# Patient Record
Sex: FEMALE | Race: WHITE | HISPANIC OR LATINO
[De-identification: names, ages, dates, MRNs, and addresses within clinical notes are randomized per-mention and may not be internally consistent; named-entity substitution may affect disease eponyms.]

---

## 2018-02-09 ENCOUNTER — APPOINTMENT (OUTPATIENT)
Dept: DERMATOLOGY | Facility: CLINIC | Age: 78
End: 2018-02-09
Payer: MEDICARE

## 2018-02-09 PROCEDURE — 99201 OFFICE OUTPATIENT NEW 10 MINUTES: CPT

## 2018-08-10 ENCOUNTER — APPOINTMENT (OUTPATIENT)
Dept: DERMATOLOGY | Facility: CLINIC | Age: 78
End: 2018-08-10

## 2019-04-10 ENCOUNTER — APPOINTMENT (OUTPATIENT)
Dept: DERMATOLOGY | Facility: CLINIC | Age: 79
End: 2019-04-10
Payer: MEDICARE

## 2019-04-10 PROCEDURE — 99213 OFFICE O/P EST LOW 20 MIN: CPT

## 2023-01-21 ENCOUNTER — OFFICE (OUTPATIENT)
Dept: URBAN - METROPOLITAN AREA CLINIC 94 | Facility: CLINIC | Age: 83
Setting detail: OPHTHALMOLOGY
End: 2023-01-21
Payer: COMMERCIAL

## 2023-01-21 DIAGNOSIS — E11.3313: ICD-10-CM

## 2023-01-21 DIAGNOSIS — H25.043: ICD-10-CM

## 2023-01-21 DIAGNOSIS — H35.3233: ICD-10-CM

## 2023-01-21 DIAGNOSIS — E11.3311: ICD-10-CM

## 2023-01-21 PROCEDURE — 92235 FLUORESCEIN ANGRPH MLTIFRAME: CPT | Performed by: SPECIALIST

## 2023-01-21 PROCEDURE — 92134 CPTRZ OPH DX IMG PST SGM RTA: CPT | Performed by: SPECIALIST

## 2023-01-21 PROCEDURE — 92014 COMPRE OPH EXAM EST PT 1/>: CPT | Performed by: SPECIALIST

## 2023-01-21 PROCEDURE — 67210 TREATMENT OF RETINAL LESION: CPT | Performed by: SPECIALIST

## 2023-01-21 ASSESSMENT — KERATOMETRY
OS_K2POWER_DIOPTERS: 43.50
METHOD_AUTO_MANUAL: AUTO
OS_AXISANGLE_DEGREES: 90
OS_K1POWER_DIOPTERS: 43.50
OD_K2POWER_DIOPTERS: 44.25
OD_K1POWER_DIOPTERS: 43.25
OD_AXISANGLE_DEGREES: 134

## 2023-01-21 ASSESSMENT — REFRACTION_AUTOREFRACTION
OS_SPHERE: +2.50
OD_AXIS: 070
OD_SPHERE: +2.50
OS_AXIS: 086
OS_CYLINDER: -1.00
OD_CYLINDER: -1.25

## 2023-01-21 ASSESSMENT — VISUAL ACUITY
OD_BCVA: 20/40-
OS_BCVA: 20/80

## 2023-01-21 ASSESSMENT — AXIALLENGTH_DERIVED
OD_AL: 22.7973
OS_AL: 22.8376

## 2023-01-21 ASSESSMENT — SPHEQUIV_DERIVED
OD_SPHEQUIV: 1.875
OS_SPHEQUIV: 2

## 2023-01-21 ASSESSMENT — CONFRONTATIONAL VISUAL FIELD TEST (CVF)
OD_FINDINGS: FULL
OS_FINDINGS: FULL

## 2023-02-17 ENCOUNTER — OFFICE (OUTPATIENT)
Dept: URBAN - METROPOLITAN AREA CLINIC 63 | Facility: CLINIC | Age: 83
Setting detail: OPHTHALMOLOGY
End: 2023-02-17
Payer: COMMERCIAL

## 2023-02-17 DIAGNOSIS — E11.3312: ICD-10-CM

## 2023-02-17 PROCEDURE — 67210 TREATMENT OF RETINAL LESION: CPT | Performed by: SPECIALIST

## 2023-02-17 ASSESSMENT — CONFRONTATIONAL VISUAL FIELD TEST (CVF)
OS_FINDINGS: FULL
OD_FINDINGS: FULL

## 2023-02-17 ASSESSMENT — VISUAL ACUITY
OS_BCVA: 20/80
OD_BCVA: 20/30-1

## 2023-02-17 ASSESSMENT — KERATOMETRY
OD_AXISANGLE_DEGREES: 134
OD_K2POWER_DIOPTERS: 44.25
OS_K1POWER_DIOPTERS: 43.50
OS_AXISANGLE_DEGREES: 90
METHOD_AUTO_MANUAL: AUTO
OD_K1POWER_DIOPTERS: 43.25
OS_K2POWER_DIOPTERS: 43.50

## 2023-02-17 ASSESSMENT — REFRACTION_AUTOREFRACTION
OS_CYLINDER: -1.00
OD_AXIS: 070
OD_SPHERE: +2.50
OS_AXIS: 086
OS_SPHERE: +2.50
OD_CYLINDER: -1.25

## 2023-02-17 ASSESSMENT — SPHEQUIV_DERIVED
OS_SPHEQUIV: 2
OD_SPHEQUIV: 1.875

## 2023-02-17 ASSESSMENT — TONOMETRY
OS_IOP_MMHG: 16
OD_IOP_MMHG: 16

## 2023-02-17 ASSESSMENT — AXIALLENGTH_DERIVED
OD_AL: 22.7973
OS_AL: 22.8376

## 2023-02-18 ENCOUNTER — OFFICE (OUTPATIENT)
Dept: URBAN - METROPOLITAN AREA CLINIC 94 | Facility: CLINIC | Age: 83
Setting detail: OPHTHALMOLOGY
End: 2023-02-18
Payer: COMMERCIAL

## 2023-02-18 DIAGNOSIS — H25.043: ICD-10-CM

## 2023-02-18 DIAGNOSIS — H52.4: ICD-10-CM

## 2023-02-18 PROCEDURE — 92015 DETERMINE REFRACTIVE STATE: CPT | Performed by: OPTOMETRIST

## 2023-02-18 PROCEDURE — 99212 OFFICE O/P EST SF 10 MIN: CPT | Performed by: OPTOMETRIST

## 2023-02-18 ASSESSMENT — REFRACTION_MANIFEST
OS_ADD: +2.75
OS_CYLINDER: -1.25
OS_VA1: 20/30+
OD_VA1: 20/40+
OD_SPHERE: +1.25
OD_CYLINDER: -2.25
OD_ADD: +2.75
OS_SPHERE: +2.75
OS_AXIS: 090
OD_AXIS: 085

## 2023-02-18 ASSESSMENT — VISUAL ACUITY
OS_BCVA: 20/60
OD_BCVA: 20/50+

## 2023-02-18 ASSESSMENT — AXIALLENGTH_DERIVED
OS_AL: 33.06
OD_AL: 23.5433
OD_AL: 23.6407
OS_AL: 32.77

## 2023-02-18 ASSESSMENT — SPHEQUIV_DERIVED
OS_SPHEQUIV: 1.75
OS_SPHEQUIV: 2.125
OD_SPHEQUIV: -0.125
OD_SPHEQUIV: 0.125

## 2023-02-18 ASSESSMENT — TONOMETRY
OS_IOP_MMHG: 18
OD_IOP_MMHG: 18

## 2023-02-18 ASSESSMENT — KERATOMETRY
OS_AXISANGLE_DEGREES: 175
OS_K1POWER_DIOPTERS: 3.00
OD_K1POWER_DIOPTERS: 43.00
OD_K2POWER_DIOPTERS: 44.00
OS_K2POWER_DIOPTERS: 43.50
OD_AXISANGLE_DEGREES: 164
METHOD_AUTO_MANUAL: AUTO

## 2023-02-18 ASSESSMENT — REFRACTION_CURRENTRX
OS_VPRISM_DIRECTION: BF
OD_ADD: +3.50
OS_ADD: +3.50
OS_OVR_VA: 20/
OD_OVR_VA: 20/
OD_SPHERE: +2.25
OS_AXIS: 096
OD_AXIS: 096
OD_CYLINDER: -1.25
OS_CYLINDER: -1.25
OD_VPRISM_DIRECTION: BF
OS_SPHERE: +2.00

## 2023-02-18 ASSESSMENT — CONFRONTATIONAL VISUAL FIELD TEST (CVF)
OS_FINDINGS: FULL
OD_FINDINGS: FULL

## 2023-02-18 ASSESSMENT — REFRACTION_AUTOREFRACTION
OD_AXIS: 088
OS_SPHERE: +3.00
OS_CYLINDER: -2.50
OD_CYLINDER: -2.75
OD_SPHERE: +1.25
OS_AXIS: 090

## 2023-04-20 ENCOUNTER — OFFICE (OUTPATIENT)
Dept: URBAN - METROPOLITAN AREA CLINIC 94 | Facility: CLINIC | Age: 83
Setting detail: OPHTHALMOLOGY
End: 2023-04-20
Payer: COMMERCIAL

## 2023-04-20 DIAGNOSIS — H52.4: ICD-10-CM

## 2023-04-20 PROCEDURE — RX/CHECK RX/CHECK: Performed by: OPTOMETRIST

## 2023-04-20 ASSESSMENT — REFRACTION_CURRENTRX
OD_ADD: +3.00
OS_VPRISM_DIRECTION: BF
OD_VPRISM_DIRECTION: BF
OD_SPHERE: +2.25
OS_CYLINDER: -1.25
OD_OVR_VA: 20/
OS_OVR_VA: 20/
OS_ADD: +3.50
OS_OVR_VA: 20/
OD_CYLINDER: -2.25
OS_ADD: +3.00
OD_SPHERE: +1.25
OS_AXIS: 088
OS_SPHERE: +2.00
OD_ADD: +3.50
OS_AXIS: 096
OD_CYLINDER: -1.25
OD_AXIS: 096
OS_SPHERE: +2.75
OS_CYLINDER: -1.25
OD_OVR_VA: 20/
OD_AXIS: 087

## 2023-04-20 ASSESSMENT — KERATOMETRY
OS_K1POWER_DIOPTERS: 43.00
OS_K2POWER_DIOPTERS: 43.50
OD_AXISANGLE_DEGREES: 171
OS_AXISANGLE_DEGREES: 029
OD_K2POWER_DIOPTERS: 44.00
OD_K1POWER_DIOPTERS: 43.00
METHOD_AUTO_MANUAL: AUTO

## 2023-04-20 ASSESSMENT — AXIALLENGTH_DERIVED
OS_AL: 22.8781
OD_AL: 23.5433
OS_AL: 23.1094
OD_AL: 23.5919

## 2023-04-20 ASSESSMENT — VISUAL ACUITY
OD_BCVA: 20/40
OS_BCVA: 20/50

## 2023-04-20 ASSESSMENT — REFRACTION_MANIFEST
OD_AXIS: 085
OD_SPHERE: +1.25
OS_VA1: 20/30+
OS_AXIS: 090
OS_CYLINDER: -1.25
OS_SPHERE: +2.75
OD_VA1: 20/40+
OD_CYLINDER: -2.25
OD_ADD: +2.75
OS_ADD: +2.75

## 2023-04-20 ASSESSMENT — REFRACTION_AUTOREFRACTION
OD_SPHERE: +1.25
OS_CYLINDER: -2.50
OS_AXIS: 097
OD_CYLINDER: -2.50
OD_AXIS: 091
OS_SPHERE: +2.75

## 2023-04-20 ASSESSMENT — SPHEQUIV_DERIVED
OS_SPHEQUIV: 1.5
OD_SPHEQUIV: 0
OS_SPHEQUIV: 2.125
OD_SPHEQUIV: 0.125

## 2023-04-20 ASSESSMENT — TONOMETRY
OD_IOP_MMHG: 18
OS_IOP_MMHG: 18

## 2023-05-20 ENCOUNTER — OFFICE (OUTPATIENT)
Dept: URBAN - METROPOLITAN AREA CLINIC 94 | Facility: CLINIC | Age: 83
Setting detail: OPHTHALMOLOGY
End: 2023-05-20
Payer: COMMERCIAL

## 2023-05-20 DIAGNOSIS — H35.3233: ICD-10-CM

## 2023-05-20 DIAGNOSIS — E11.3311: ICD-10-CM

## 2023-05-20 DIAGNOSIS — E11.3313: ICD-10-CM

## 2023-05-20 PROCEDURE — 92134 CPTRZ OPH DX IMG PST SGM RTA: CPT | Performed by: SPECIALIST

## 2023-05-20 PROCEDURE — 92014 COMPRE OPH EXAM EST PT 1/>: CPT | Performed by: SPECIALIST

## 2023-05-20 PROCEDURE — 92235 FLUORESCEIN ANGRPH MLTIFRAME: CPT | Performed by: SPECIALIST

## 2023-05-20 PROCEDURE — 67210 TREATMENT OF RETINAL LESION: CPT | Performed by: SPECIALIST

## 2023-05-20 ASSESSMENT — KERATOMETRY
METHOD_AUTO_MANUAL: AUTO
OD_AXISANGLE_DEGREES: 171
OD_K1POWER_DIOPTERS: 43.00
OS_AXISANGLE_DEGREES: 029
OS_K2POWER_DIOPTERS: 43.50
OS_K1POWER_DIOPTERS: 43.00
OD_K2POWER_DIOPTERS: 44.00

## 2023-05-20 ASSESSMENT — VISUAL ACUITY
OS_BCVA: 20/60
OD_BCVA: 20/40-1

## 2023-05-20 ASSESSMENT — CONFRONTATIONAL VISUAL FIELD TEST (CVF)
OD_FINDINGS: FULL
OS_FINDINGS: FULL

## 2023-05-20 ASSESSMENT — AXIALLENGTH_DERIVED
OS_AL: 23.1094
OD_AL: 23.5919

## 2023-05-20 ASSESSMENT — REFRACTION_AUTOREFRACTION
OS_CYLINDER: -2.50
OD_AXIS: 091
OS_SPHERE: +2.75
OD_CYLINDER: -2.50
OD_SPHERE: +1.25
OS_AXIS: 097

## 2023-05-20 ASSESSMENT — SPHEQUIV_DERIVED
OS_SPHEQUIV: 1.5
OD_SPHEQUIV: 0

## 2023-05-20 ASSESSMENT — TONOMETRY
OS_IOP_MMHG: 16
OD_IOP_MMHG: 18

## 2023-06-02 ENCOUNTER — OFFICE (OUTPATIENT)
Dept: URBAN - METROPOLITAN AREA CLINIC 63 | Facility: CLINIC | Age: 83
Setting detail: OPHTHALMOLOGY
End: 2023-06-02
Payer: COMMERCIAL

## 2023-06-02 DIAGNOSIS — E11.3312: ICD-10-CM

## 2023-06-02 PROCEDURE — 67210 TREATMENT OF RETINAL LESION: CPT | Performed by: SPECIALIST

## 2023-06-02 ASSESSMENT — TONOMETRY
OS_IOP_MMHG: 20
OD_IOP_MMHG: 18

## 2023-06-02 ASSESSMENT — REFRACTION_AUTOREFRACTION
OD_CYLINDER: -2.50
OS_AXIS: 097
OD_SPHERE: +1.25
OS_CYLINDER: -2.50
OS_SPHERE: +2.75
OD_AXIS: 091

## 2023-06-02 ASSESSMENT — SPHEQUIV_DERIVED
OD_SPHEQUIV: 0
OS_SPHEQUIV: 1.5

## 2023-06-02 ASSESSMENT — AXIALLENGTH_DERIVED
OD_AL: 23.5919
OS_AL: 23.1094

## 2023-06-02 ASSESSMENT — KERATOMETRY
OD_K2POWER_DIOPTERS: 44.00
OS_K1POWER_DIOPTERS: 43.00
METHOD_AUTO_MANUAL: AUTO
OS_K2POWER_DIOPTERS: 43.50
OD_K1POWER_DIOPTERS: 43.00
OS_AXISANGLE_DEGREES: 029
OD_AXISANGLE_DEGREES: 171

## 2023-06-02 ASSESSMENT — VISUAL ACUITY
OD_BCVA: 20/30
OS_BCVA: 20/25-1

## 2023-06-02 ASSESSMENT — CONFRONTATIONAL VISUAL FIELD TEST (CVF)
OS_FINDINGS: FULL
OD_FINDINGS: FULL

## 2023-09-11 ENCOUNTER — OFFICE (OUTPATIENT)
Dept: URBAN - METROPOLITAN AREA CLINIC 94 | Facility: CLINIC | Age: 83
Setting detail: OPHTHALMOLOGY
End: 2023-09-11
Payer: COMMERCIAL

## 2023-09-11 DIAGNOSIS — E11.3313: ICD-10-CM

## 2023-09-11 DIAGNOSIS — H35.3233: ICD-10-CM

## 2023-09-11 DIAGNOSIS — E11.3311: ICD-10-CM

## 2023-09-11 PROCEDURE — 67210 TREATMENT OF RETINAL LESION: CPT | Performed by: SPECIALIST

## 2023-09-11 PROCEDURE — 92014 COMPRE OPH EXAM EST PT 1/>: CPT | Performed by: SPECIALIST

## 2023-09-11 PROCEDURE — 92134 CPTRZ OPH DX IMG PST SGM RTA: CPT | Performed by: SPECIALIST

## 2023-09-11 PROCEDURE — 92235 FLUORESCEIN ANGRPH MLTIFRAME: CPT | Performed by: SPECIALIST

## 2023-09-11 ASSESSMENT — CONFRONTATIONAL VISUAL FIELD TEST (CVF)
OD_FINDINGS: FULL
OS_FINDINGS: FULL

## 2023-09-11 ASSESSMENT — KERATOMETRY
OD_K1POWER_DIOPTERS: 43.00
OS_AXISANGLE_DEGREES: 029
OD_K2POWER_DIOPTERS: 44.00
OD_AXISANGLE_DEGREES: 171
OS_K1POWER_DIOPTERS: 43.00
OS_K2POWER_DIOPTERS: 43.50
METHOD_AUTO_MANUAL: AUTO

## 2023-09-11 ASSESSMENT — TONOMETRY
OD_IOP_MMHG: 17
OS_IOP_MMHG: 18

## 2023-09-11 ASSESSMENT — VISUAL ACUITY
OS_BCVA: 20/60-
OD_BCVA: 20/40-

## 2023-09-11 ASSESSMENT — REFRACTION_AUTOREFRACTION
OS_SPHERE: +2.75
OS_AXIS: 097
OD_CYLINDER: -2.50
OD_AXIS: 091
OD_SPHERE: +1.25
OS_CYLINDER: -2.50

## 2023-09-11 ASSESSMENT — AXIALLENGTH_DERIVED
OS_AL: 23.1094
OD_AL: 23.5919

## 2023-09-11 ASSESSMENT — SPHEQUIV_DERIVED
OD_SPHEQUIV: 0
OS_SPHEQUIV: 1.5

## 2023-09-25 ENCOUNTER — INPATIENT (INPATIENT)
Facility: HOSPITAL | Age: 83
LOS: 6 days | Discharge: HOME HEALTH | DRG: 871 | End: 2023-10-02
Attending: INTERNAL MEDICINE | Admitting: STUDENT IN AN ORGANIZED HEALTH CARE EDUCATION/TRAINING PROGRAM
Payer: MEDICARE

## 2023-09-25 VITALS
TEMPERATURE: 99 F | HEART RATE: 94 BPM | SYSTOLIC BLOOD PRESSURE: 124 MMHG | OXYGEN SATURATION: 96 % | RESPIRATION RATE: 20 BRPM | DIASTOLIC BLOOD PRESSURE: 60 MMHG

## 2023-09-25 LAB
ALBUMIN SERPL ELPH-MCNC: 3.5 G/DL — SIGNIFICANT CHANGE UP (ref 3.3–5.2)
ALP SERPL-CCNC: 66 U/L — SIGNIFICANT CHANGE UP (ref 40–120)
ALT FLD-CCNC: 17 U/L — SIGNIFICANT CHANGE UP
ANION GAP SERPL CALC-SCNC: 11 MMOL/L — SIGNIFICANT CHANGE UP (ref 5–17)
APPEARANCE UR: ABNORMAL
APTT BLD: 28.4 SEC — SIGNIFICANT CHANGE UP (ref 24.5–35.6)
AST SERPL-CCNC: 19 U/L — SIGNIFICANT CHANGE UP
BACTERIA # UR AUTO: ABNORMAL
BASE EXCESS BLDV CALC-SCNC: 1.7 MMOL/L — SIGNIFICANT CHANGE UP (ref -2–3)
BASOPHILS # BLD AUTO: 0.02 K/UL — SIGNIFICANT CHANGE UP (ref 0–0.2)
BASOPHILS NFR BLD AUTO: 0.2 % — SIGNIFICANT CHANGE UP (ref 0–2)
BILIRUB SERPL-MCNC: 0.4 MG/DL — SIGNIFICANT CHANGE UP (ref 0.4–2)
BILIRUB UR-MCNC: NEGATIVE — SIGNIFICANT CHANGE UP
BUN SERPL-MCNC: 15.9 MG/DL — SIGNIFICANT CHANGE UP (ref 8–20)
CA-I SERPL-SCNC: 1.28 MMOL/L — SIGNIFICANT CHANGE UP (ref 1.15–1.33)
CALCIUM SERPL-MCNC: 9.5 MG/DL — SIGNIFICANT CHANGE UP (ref 8.4–10.5)
CHLORIDE BLDV-SCNC: 93 MMOL/L — LOW (ref 96–108)
CHLORIDE SERPL-SCNC: 91 MMOL/L — LOW (ref 96–108)
CO2 SERPL-SCNC: 26 MMOL/L — SIGNIFICANT CHANGE UP (ref 22–29)
COLOR SPEC: YELLOW — SIGNIFICANT CHANGE UP
CREAT SERPL-MCNC: 0.87 MG/DL — SIGNIFICANT CHANGE UP (ref 0.5–1.3)
DIFF PNL FLD: ABNORMAL
EGFR: 67 ML/MIN/1.73M2 — SIGNIFICANT CHANGE UP
EOSINOPHIL # BLD AUTO: 0 K/UL — SIGNIFICANT CHANGE UP (ref 0–0.5)
EOSINOPHIL NFR BLD AUTO: 0 % — SIGNIFICANT CHANGE UP (ref 0–6)
EPI CELLS # UR: SIGNIFICANT CHANGE UP
GAS PNL BLDV: 124 MMOL/L — LOW (ref 136–145)
GAS PNL BLDV: SIGNIFICANT CHANGE UP
GLUCOSE BLDV-MCNC: 251 MG/DL — HIGH (ref 70–99)
GLUCOSE SERPL-MCNC: 229 MG/DL — HIGH (ref 70–99)
GLUCOSE UR QL: 100 MG/DL
HCO3 BLDV-SCNC: 28 MMOL/L — SIGNIFICANT CHANGE UP (ref 22–29)
HCT VFR BLD CALC: 32.1 % — LOW (ref 34.5–45)
HCT VFR BLDA CALC: 36 % — SIGNIFICANT CHANGE UP
HGB BLD CALC-MCNC: 11.9 G/DL — SIGNIFICANT CHANGE UP (ref 11.7–16.1)
HGB BLD-MCNC: 11.1 G/DL — LOW (ref 11.5–15.5)
IMM GRANULOCYTES NFR BLD AUTO: 0.6 % — SIGNIFICANT CHANGE UP (ref 0–0.9)
INR BLD: 1.19 RATIO — HIGH (ref 0.85–1.18)
KETONES UR-MCNC: NEGATIVE — SIGNIFICANT CHANGE UP
LACTATE BLDV-MCNC: 1.1 MMOL/L — SIGNIFICANT CHANGE UP (ref 0.5–2)
LEUKOCYTE ESTERASE UR-ACNC: ABNORMAL
LYMPHOCYTES # BLD AUTO: 0.8 K/UL — LOW (ref 1–3.3)
LYMPHOCYTES # BLD AUTO: 6.1 % — LOW (ref 13–44)
MCHC RBC-ENTMCNC: 28.5 PG — SIGNIFICANT CHANGE UP (ref 27–34)
MCHC RBC-ENTMCNC: 34.6 GM/DL — SIGNIFICANT CHANGE UP (ref 32–36)
MCV RBC AUTO: 82.3 FL — SIGNIFICANT CHANGE UP (ref 80–100)
MONOCYTES # BLD AUTO: 0.97 K/UL — HIGH (ref 0–0.9)
MONOCYTES NFR BLD AUTO: 7.4 % — SIGNIFICANT CHANGE UP (ref 2–14)
NEUTROPHILS # BLD AUTO: 11.24 K/UL — HIGH (ref 1.8–7.4)
NEUTROPHILS NFR BLD AUTO: 85.7 % — HIGH (ref 43–77)
NITRITE UR-MCNC: NEGATIVE — SIGNIFICANT CHANGE UP
PCO2 BLDV: 51 MMHG — HIGH (ref 39–42)
PH BLDV: 7.34 — SIGNIFICANT CHANGE UP (ref 7.32–7.43)
PH UR: 5 — SIGNIFICANT CHANGE UP (ref 5–8)
PLATELET # BLD AUTO: 208 K/UL — SIGNIFICANT CHANGE UP (ref 150–400)
PO2 BLDV: 57 MMHG — HIGH (ref 25–45)
POTASSIUM BLDV-SCNC: 4.1 MMOL/L — SIGNIFICANT CHANGE UP (ref 3.5–5.1)
POTASSIUM SERPL-MCNC: 4.1 MMOL/L — SIGNIFICANT CHANGE UP (ref 3.5–5.3)
POTASSIUM SERPL-SCNC: 4.1 MMOL/L — SIGNIFICANT CHANGE UP (ref 3.5–5.3)
PROT SERPL-MCNC: 6.8 G/DL — SIGNIFICANT CHANGE UP (ref 6.6–8.7)
PROT UR-MCNC: 100
PROTHROM AB SERPL-ACNC: 13.1 SEC — HIGH (ref 9.5–13)
RAPID RVP RESULT: SIGNIFICANT CHANGE UP
RBC # BLD: 3.9 M/UL — SIGNIFICANT CHANGE UP (ref 3.8–5.2)
RBC # FLD: 13.1 % — SIGNIFICANT CHANGE UP (ref 10.3–14.5)
RBC CASTS # UR COMP ASSIST: SIGNIFICANT CHANGE UP /HPF (ref 0–4)
SAO2 % BLDV: 86.6 % — SIGNIFICANT CHANGE UP
SARS-COV-2 RNA SPEC QL NAA+PROBE: SIGNIFICANT CHANGE UP
SODIUM SERPL-SCNC: 127 MMOL/L — LOW (ref 135–145)
SP GR SPEC: 1.01 — SIGNIFICANT CHANGE UP (ref 1.01–1.02)
UROBILINOGEN FLD QL: NEGATIVE MG/DL — SIGNIFICANT CHANGE UP
WBC # BLD: 13.11 K/UL — HIGH (ref 3.8–10.5)
WBC # FLD AUTO: 13.11 K/UL — HIGH (ref 3.8–10.5)
WBC UR QL: ABNORMAL /HPF (ref 0–5)

## 2023-09-25 PROCEDURE — 71045 X-RAY EXAM CHEST 1 VIEW: CPT | Mod: 26,77

## 2023-09-25 PROCEDURE — 71045 X-RAY EXAM CHEST 1 VIEW: CPT | Mod: 26

## 2023-09-25 PROCEDURE — 99285 EMERGENCY DEPT VISIT HI MDM: CPT

## 2023-09-25 PROCEDURE — 70450 CT HEAD/BRAIN W/O DYE: CPT | Mod: 26,MA

## 2023-09-25 RX ORDER — CEFTRIAXONE 500 MG/1
1000 INJECTION, POWDER, FOR SOLUTION INTRAMUSCULAR; INTRAVENOUS ONCE
Refills: 0 | Status: COMPLETED | OUTPATIENT
Start: 2023-09-25 | End: 2023-09-25

## 2023-09-25 RX ORDER — FUROSEMIDE 40 MG
40 TABLET ORAL ONCE
Refills: 0 | Status: COMPLETED | OUTPATIENT
Start: 2023-09-25 | End: 2023-09-25

## 2023-09-25 RX ORDER — ONDANSETRON 8 MG/1
4 TABLET, FILM COATED ORAL ONCE
Refills: 0 | Status: COMPLETED | OUTPATIENT
Start: 2023-09-25 | End: 2023-09-25

## 2023-09-25 RX ORDER — SODIUM CHLORIDE 9 MG/ML
2200 INJECTION INTRAMUSCULAR; INTRAVENOUS; SUBCUTANEOUS ONCE
Refills: 0 | Status: COMPLETED | OUTPATIENT
Start: 2023-09-25 | End: 2023-09-25

## 2023-09-25 RX ORDER — CEFTRIAXONE 500 MG/1
1000 INJECTION, POWDER, FOR SOLUTION INTRAMUSCULAR; INTRAVENOUS ONCE
Refills: 0 | Status: DISCONTINUED | OUTPATIENT
Start: 2023-09-25 | End: 2023-09-25

## 2023-09-25 RX ORDER — ACETAMINOPHEN 500 MG
650 TABLET ORAL ONCE
Refills: 0 | Status: DISCONTINUED | OUTPATIENT
Start: 2023-09-25 | End: 2023-09-25

## 2023-09-25 RX ORDER — ACETAMINOPHEN 500 MG
1000 TABLET ORAL ONCE
Refills: 0 | Status: COMPLETED | OUTPATIENT
Start: 2023-09-25 | End: 2023-09-25

## 2023-09-25 RX ADMIN — Medication 1000 MILLIGRAM(S): at 23:35

## 2023-09-25 RX ADMIN — ONDANSETRON 4 MILLIGRAM(S): 8 TABLET, FILM COATED ORAL at 18:21

## 2023-09-25 RX ADMIN — CEFTRIAXONE 1000 MILLIGRAM(S): 500 INJECTION, POWDER, FOR SOLUTION INTRAMUSCULAR; INTRAVENOUS at 20:53

## 2023-09-25 RX ADMIN — Medication 40 MILLIGRAM(S): at 23:46

## 2023-09-25 RX ADMIN — SODIUM CHLORIDE 2200 MILLILITER(S): 9 INJECTION INTRAMUSCULAR; INTRAVENOUS; SUBCUTANEOUS at 18:21

## 2023-09-25 RX ADMIN — Medication 400 MILLIGRAM(S): at 23:05

## 2023-09-25 NOTE — ED ADULT NURSE REASSESSMENT NOTE - NS ED NURSE REASSESS COMMENT FT1
Assumed care of patient at this time from dayshift RN, patient is awake, calm, RR even and unlabored, denies sob, denies chest pain, waiting radiology results, family at bedside.

## 2023-09-25 NOTE — ED PROVIDER NOTE - PROGRESS NOTE DETAILS
Received signout from Dr. Rosa pending CT head.  Patient began to have increasing tachypnea respiratory distress tripoding with shortness of breath.  Found to have acute CHF exacerbation.'s chest x-ray noted increased vascular congestion.  Given Lasix.  Patient also found to have fever of 103.7.  Given Tylenol.  Patient improved with mental status and respirations.  Will admit to telemetry for further monitoring.

## 2023-09-25 NOTE — ED PROVIDER NOTE - CLINICAL SUMMARY MEDICAL DECISION MAKING FREE TEXT BOX
83 year old female presenting with day 2 of dysuria with chills, nausea, intermittent confusion today. Plan - symptomatic treatment, labs, reassess.

## 2023-09-25 NOTE — ED PROVIDER NOTE - PHYSICAL EXAMINATION
Gen: well appearing, no acute distress  Head: normocephalic, atraumatic  EENT: EOMI, moist mucous membranes, no scleral icterus, no JVD  Lung: no increased work of breathing, clear to auscultation bilaterally, no wheezing, speaking in full sentences  CV: regular rate, regular rhythm, normal s1/s2, 2+ radial pulses bilaterally  Abd: soft, non-tender, non-distended, no rebound tenderness or guarding; no CVA tenderness  MSK: No edema, no visible deformities, full range of motion in all 4 extremities  Neuro: Awake, alert, no focal neurologic deficits  Skin: No obvious rash, no jaundice

## 2023-09-25 NOTE — ED ADULT NURSE REASSESSMENT NOTE - NS ED NURSE REASSESS COMMENT FT1
MD Robles made aware of patient's increase in temp, MD at bedside assessing patient, patient is complaining of chills.

## 2023-09-25 NOTE — ED PROVIDER NOTE - ATTENDING CONTRIBUTION TO CARE
The patient seen with     UTI    I, Serafin Rosa, performed the initial face to face bedside interview with this patient regarding history of present illness, review of symptoms and relevant past medical, social and family history.  I completed an independent physical examination.  I was the initial provider who evaluated this patient. I have signed out the follow up of any pending tests (i.e. labs, radiological studies) to the resident  I have communicated the patient’s plan of care and disposition with the resident

## 2023-09-25 NOTE — ED ADULT NURSE NOTE - OBJECTIVE STATEMENT
c/o chills, body aches, leg pain and swelling, and nausea x 2 days. As per pts daughter at bedside pt has been having worsening chills and became unsteady/weak on her feet with periods of disorientation. Pt denies HA, dizziness, SOB, V/D, CP, palpitations, abd pain, fever. Pt AOx4, speaking coherently, respirations even and unlabored on RA, skin warm and dry.

## 2023-09-25 NOTE — ED PROVIDER NOTE - CARE PLAN
1 Principal Discharge DX:	Metabolic encephalopathy  Secondary Diagnosis:	Acute UTI  Secondary Diagnosis:	Acute exacerbation of CHF (congestive heart failure)

## 2023-09-25 NOTE — ED PROVIDER NOTE - OBJECTIVE STATEMENT
83 year old female with PMHx NIDDM, b/l feet edema (on lasix), HTN presenting for evaluation of day 2 of dysuria. Patient developed chills and "the shakes" today, daughter feels patient seems disoriented at times, also c/o nausea. Denies any vomiting, diarrhea, abdominal pain, fever, chest pain, difficulty breathing.

## 2023-09-25 NOTE — ED PROVIDER NOTE - NS ED ROS FT
Gen: No fever, (+) chills  ENT: No congestion, no rhinorrhea  Resp: No cough, no trouble breathing  Cardiovascular: No chest pain, no palpitation  Gastrointestinal: (+)nausea, no vomiting, no diarrhea  :  No change in urine output; (+)dysuria, no hematuria  MS: No joint or muscle pain  Neuro: No headache; no abnormal movements. (+)intermittent confusion  Remainder negative, except as per the HPI

## 2023-09-25 NOTE — ED ADULT TRIAGE NOTE - CHIEF COMPLAINT QUOTE
since awakening yesterday patient daughter states patient has "the shakes" and is not acting right, disoriented at times. in triage patient A&Ox4, c/o burning upon urination starting today

## 2023-09-26 DIAGNOSIS — G93.41 METABOLIC ENCEPHALOPATHY: ICD-10-CM

## 2023-09-26 LAB
ALBUMIN SERPL ELPH-MCNC: 3.3 G/DL — SIGNIFICANT CHANGE UP (ref 3.3–5.2)
ALP SERPL-CCNC: 66 U/L — SIGNIFICANT CHANGE UP (ref 40–120)
ALT FLD-CCNC: 18 U/L — SIGNIFICANT CHANGE UP
ANION GAP SERPL CALC-SCNC: 11 MMOL/L — SIGNIFICANT CHANGE UP (ref 5–17)
APPEARANCE UR: CLEAR — SIGNIFICANT CHANGE UP
AST SERPL-CCNC: 20 U/L — SIGNIFICANT CHANGE UP
BACTERIA # UR AUTO: ABNORMAL
BASOPHILS # BLD AUTO: 0.03 K/UL — SIGNIFICANT CHANGE UP (ref 0–0.2)
BASOPHILS NFR BLD AUTO: 0.2 % — SIGNIFICANT CHANGE UP (ref 0–2)
BILIRUB SERPL-MCNC: 0.3 MG/DL — LOW (ref 0.4–2)
BILIRUB UR-MCNC: NEGATIVE — SIGNIFICANT CHANGE UP
BUN SERPL-MCNC: 12.5 MG/DL — SIGNIFICANT CHANGE UP (ref 8–20)
CALCIUM SERPL-MCNC: 9.3 MG/DL — SIGNIFICANT CHANGE UP (ref 8.4–10.5)
CHLORIDE SERPL-SCNC: 96 MMOL/L — SIGNIFICANT CHANGE UP (ref 96–108)
CK MB CFR SERPL CALC: 2.2 NG/ML — SIGNIFICANT CHANGE UP (ref 0–6.7)
CK SERPL-CCNC: 193 U/L — HIGH (ref 25–170)
CO2 SERPL-SCNC: 26 MMOL/L — SIGNIFICANT CHANGE UP (ref 22–29)
COLOR SPEC: YELLOW — SIGNIFICANT CHANGE UP
CREAT ?TM UR-MCNC: 39 MG/DL — SIGNIFICANT CHANGE UP
CREAT SERPL-MCNC: 0.81 MG/DL — SIGNIFICANT CHANGE UP (ref 0.5–1.3)
DIFF PNL FLD: ABNORMAL
E COLI DNA BLD POS QL NAA+NON-PROBE: SIGNIFICANT CHANGE UP
EGFR: 73 ML/MIN/1.73M2 — SIGNIFICANT CHANGE UP
EOSINOPHIL # BLD AUTO: 0 K/UL — SIGNIFICANT CHANGE UP (ref 0–0.5)
EOSINOPHIL NFR BLD AUTO: 0 % — SIGNIFICANT CHANGE UP (ref 0–6)
EPI CELLS # UR: SIGNIFICANT CHANGE UP
GLUCOSE BLDC GLUCOMTR-MCNC: 112 MG/DL — HIGH (ref 70–99)
GLUCOSE BLDC GLUCOMTR-MCNC: 134 MG/DL — HIGH (ref 70–99)
GLUCOSE BLDC GLUCOMTR-MCNC: 95 MG/DL — SIGNIFICANT CHANGE UP (ref 70–99)
GLUCOSE SERPL-MCNC: 130 MG/DL — HIGH (ref 70–99)
GLUCOSE UR QL: NEGATIVE MG/DL — SIGNIFICANT CHANGE UP
GRAM STN FLD: SIGNIFICANT CHANGE UP
HCT VFR BLD CALC: 32.2 % — LOW (ref 34.5–45)
HGB BLD-MCNC: 10.9 G/DL — LOW (ref 11.5–15.5)
IMM GRANULOCYTES NFR BLD AUTO: 0.6 % — SIGNIFICANT CHANGE UP (ref 0–0.9)
KETONES UR-MCNC: NEGATIVE — SIGNIFICANT CHANGE UP
LEUKOCYTE ESTERASE UR-ACNC: ABNORMAL
LYMPHOCYTES # BLD AUTO: 0.95 K/UL — LOW (ref 1–3.3)
LYMPHOCYTES # BLD AUTO: 6 % — LOW (ref 13–44)
MAGNESIUM SERPL-MCNC: 1.9 MG/DL — SIGNIFICANT CHANGE UP (ref 1.6–2.6)
MCHC RBC-ENTMCNC: 28.6 PG — SIGNIFICANT CHANGE UP (ref 27–34)
MCHC RBC-ENTMCNC: 33.9 GM/DL — SIGNIFICANT CHANGE UP (ref 32–36)
MCV RBC AUTO: 84.5 FL — SIGNIFICANT CHANGE UP (ref 80–100)
METHOD TYPE: SIGNIFICANT CHANGE UP
MONOCYTES # BLD AUTO: 1.23 K/UL — HIGH (ref 0–0.9)
MONOCYTES NFR BLD AUTO: 7.7 % — SIGNIFICANT CHANGE UP (ref 2–14)
NEUTROPHILS # BLD AUTO: 13.62 K/UL — HIGH (ref 1.8–7.4)
NEUTROPHILS NFR BLD AUTO: 85.5 % — HIGH (ref 43–77)
NITRITE UR-MCNC: NEGATIVE — SIGNIFICANT CHANGE UP
NT-PROBNP SERPL-SCNC: 557 PG/ML — HIGH (ref 0–300)
OSMOLALITY SERPL: 287 MOSMOL/KG — SIGNIFICANT CHANGE UP (ref 280–301)
OSMOLALITY UR: 258 MOSM/KG — LOW (ref 300–1000)
PH UR: 6 — SIGNIFICANT CHANGE UP (ref 5–8)
PHOSPHATE SERPL-MCNC: 2.3 MG/DL — LOW (ref 2.4–4.7)
PLATELET # BLD AUTO: 200 K/UL — SIGNIFICANT CHANGE UP (ref 150–400)
POTASSIUM SERPL-MCNC: 4 MMOL/L — SIGNIFICANT CHANGE UP (ref 3.5–5.3)
POTASSIUM SERPL-SCNC: 4 MMOL/L — SIGNIFICANT CHANGE UP (ref 3.5–5.3)
POTASSIUM UR-SCNC: 25 MMOL/L — SIGNIFICANT CHANGE UP
PROT ?TM UR-MCNC: 23 MG/DL — HIGH (ref 0–12)
PROT SERPL-MCNC: 6.7 G/DL — SIGNIFICANT CHANGE UP (ref 6.6–8.7)
PROT UR-MCNC: 15
PROT/CREAT UR-RTO: 0.6 RATIO — HIGH
RBC # BLD: 3.81 M/UL — SIGNIFICANT CHANGE UP (ref 3.8–5.2)
RBC # FLD: 13.3 % — SIGNIFICANT CHANGE UP (ref 10.3–14.5)
RBC CASTS # UR COMP ASSIST: ABNORMAL /HPF (ref 0–4)
SODIUM SERPL-SCNC: 133 MMOL/L — LOW (ref 135–145)
SODIUM UR-SCNC: 41 MMOL/L — SIGNIFICANT CHANGE UP
SP GR SPEC: 1.01 — SIGNIFICANT CHANGE UP (ref 1.01–1.02)
SPECIMEN SOURCE: SIGNIFICANT CHANGE UP
SPECIMEN SOURCE: SIGNIFICANT CHANGE UP
TROPONIN T SERPL-MCNC: <0.01 NG/ML — SIGNIFICANT CHANGE UP (ref 0–0.06)
TROPONIN T SERPL-MCNC: <0.01 NG/ML — SIGNIFICANT CHANGE UP (ref 0–0.06)
UROBILINOGEN FLD QL: NEGATIVE MG/DL — SIGNIFICANT CHANGE UP
UUN UR-MCNC: 251 MG/DL — SIGNIFICANT CHANGE UP
WBC # BLD: 15.92 K/UL — HIGH (ref 3.8–10.5)
WBC # FLD AUTO: 15.92 K/UL — HIGH (ref 3.8–10.5)
WBC UR QL: ABNORMAL /HPF (ref 0–5)

## 2023-09-26 PROCEDURE — 93010 ELECTROCARDIOGRAM REPORT: CPT

## 2023-09-26 PROCEDURE — 93306 TTE W/DOPPLER COMPLETE: CPT | Mod: 26

## 2023-09-26 PROCEDURE — 99223 1ST HOSP IP/OBS HIGH 75: CPT

## 2023-09-26 RX ORDER — GLIMEPIRIDE 1 MG
1 TABLET ORAL
Refills: 0 | DISCHARGE

## 2023-09-26 RX ORDER — AMLODIPINE BESYLATE 2.5 MG/1
1 TABLET ORAL
Refills: 0 | DISCHARGE

## 2023-09-26 RX ORDER — ENOXAPARIN SODIUM 100 MG/ML
40 INJECTION SUBCUTANEOUS EVERY 24 HOURS
Refills: 0 | Status: DISCONTINUED | OUTPATIENT
Start: 2023-09-26 | End: 2023-10-02

## 2023-09-26 RX ORDER — FUROSEMIDE 40 MG
1 TABLET ORAL
Refills: 0 | DISCHARGE

## 2023-09-26 RX ORDER — DEXTROSE 50 % IN WATER 50 %
12.5 SYRINGE (ML) INTRAVENOUS ONCE
Refills: 0 | Status: DISCONTINUED | OUTPATIENT
Start: 2023-09-26 | End: 2023-10-02

## 2023-09-26 RX ORDER — GLUCAGON INJECTION, SOLUTION 0.5 MG/.1ML
1 INJECTION, SOLUTION SUBCUTANEOUS ONCE
Refills: 0 | Status: DISCONTINUED | OUTPATIENT
Start: 2023-09-26 | End: 2023-10-02

## 2023-09-26 RX ORDER — SODIUM CHLORIDE 9 MG/ML
1000 INJECTION, SOLUTION INTRAVENOUS
Refills: 0 | Status: DISCONTINUED | OUTPATIENT
Start: 2023-09-26 | End: 2023-10-02

## 2023-09-26 RX ORDER — AMLODIPINE BESYLATE 2.5 MG/1
10 TABLET ORAL DAILY
Refills: 0 | Status: DISCONTINUED | OUTPATIENT
Start: 2023-09-26 | End: 2023-10-02

## 2023-09-26 RX ORDER — INSULIN LISPRO 100/ML
VIAL (ML) SUBCUTANEOUS
Refills: 0 | Status: DISCONTINUED | OUTPATIENT
Start: 2023-09-26 | End: 2023-10-02

## 2023-09-26 RX ORDER — CEFTRIAXONE 500 MG/1
2000 INJECTION, POWDER, FOR SOLUTION INTRAMUSCULAR; INTRAVENOUS EVERY 24 HOURS
Refills: 0 | Status: DISCONTINUED | OUTPATIENT
Start: 2023-09-26 | End: 2023-10-02

## 2023-09-26 RX ORDER — INFLUENZA VIRUS VACCINE 15; 15; 15; 15 UG/.5ML; UG/.5ML; UG/.5ML; UG/.5ML
0.7 SUSPENSION INTRAMUSCULAR ONCE
Refills: 0 | Status: DISCONTINUED | OUTPATIENT
Start: 2023-09-26 | End: 2023-10-02

## 2023-09-26 RX ORDER — LANOLIN ALCOHOL/MO/W.PET/CERES
3 CREAM (GRAM) TOPICAL AT BEDTIME
Refills: 0 | Status: DISCONTINUED | OUTPATIENT
Start: 2023-09-26 | End: 2023-10-02

## 2023-09-26 RX ORDER — DEXTROSE 50 % IN WATER 50 %
15 SYRINGE (ML) INTRAVENOUS ONCE
Refills: 0 | Status: DISCONTINUED | OUTPATIENT
Start: 2023-09-26 | End: 2023-10-02

## 2023-09-26 RX ORDER — ONDANSETRON 8 MG/1
4 TABLET, FILM COATED ORAL EVERY 8 HOURS
Refills: 0 | Status: DISCONTINUED | OUTPATIENT
Start: 2023-09-26 | End: 2023-10-02

## 2023-09-26 RX ORDER — DEXTROSE 50 % IN WATER 50 %
25 SYRINGE (ML) INTRAVENOUS ONCE
Refills: 0 | Status: DISCONTINUED | OUTPATIENT
Start: 2023-09-26 | End: 2023-10-02

## 2023-09-26 RX ORDER — LOSARTAN POTASSIUM 100 MG/1
100 TABLET, FILM COATED ORAL DAILY
Refills: 0 | Status: DISCONTINUED | OUTPATIENT
Start: 2023-09-26 | End: 2023-10-02

## 2023-09-26 RX ORDER — FUROSEMIDE 40 MG
40 TABLET ORAL
Refills: 0 | Status: DISCONTINUED | OUTPATIENT
Start: 2023-09-26 | End: 2023-09-27

## 2023-09-26 RX ORDER — OLMESARTAN MEDOXOMIL-HYDROCHLOROTHIAZIDE 25; 40 MG/1; MG/1
1 TABLET, FILM COATED ORAL
Refills: 0 | DISCHARGE

## 2023-09-26 RX ORDER — NYSTATIN CREAM 100000 [USP'U]/G
1 CREAM TOPICAL THREE TIMES A DAY
Refills: 0 | Status: DISCONTINUED | OUTPATIENT
Start: 2023-09-26 | End: 2023-10-02

## 2023-09-26 RX ORDER — SODIUM,POTASSIUM PHOSPHATES 278-250MG
1 POWDER IN PACKET (EA) ORAL
Refills: 0 | Status: DISCONTINUED | OUTPATIENT
Start: 2023-09-26 | End: 2023-09-27

## 2023-09-26 RX ORDER — CEFTRIAXONE 500 MG/1
1000 INJECTION, POWDER, FOR SOLUTION INTRAMUSCULAR; INTRAVENOUS EVERY 24 HOURS
Refills: 0 | Status: DISCONTINUED | OUTPATIENT
Start: 2023-09-26 | End: 2023-09-26

## 2023-09-26 RX ORDER — ACETAMINOPHEN 500 MG
650 TABLET ORAL EVERY 6 HOURS
Refills: 0 | Status: DISCONTINUED | OUTPATIENT
Start: 2023-09-26 | End: 2023-10-02

## 2023-09-26 RX ORDER — METFORMIN HYDROCHLORIDE 850 MG/1
1 TABLET ORAL
Refills: 0 | DISCHARGE

## 2023-09-26 RX ADMIN — Medication 1 TABLET(S): at 13:16

## 2023-09-26 RX ADMIN — NYSTATIN CREAM 1 APPLICATION(S): 100000 CREAM TOPICAL at 06:51

## 2023-09-26 RX ADMIN — LOSARTAN POTASSIUM 100 MILLIGRAM(S): 100 TABLET, FILM COATED ORAL at 06:05

## 2023-09-26 RX ADMIN — CEFTRIAXONE 2000 MILLIGRAM(S): 500 INJECTION, POWDER, FOR SOLUTION INTRAMUSCULAR; INTRAVENOUS at 13:15

## 2023-09-26 RX ADMIN — Medication 40 MILLIGRAM(S): at 06:05

## 2023-09-26 RX ADMIN — Medication 650 MILLIGRAM(S): at 23:48

## 2023-09-26 RX ADMIN — NYSTATIN CREAM 1 APPLICATION(S): 100000 CREAM TOPICAL at 13:18

## 2023-09-26 RX ADMIN — Medication 1 TABLET(S): at 18:17

## 2023-09-26 RX ADMIN — Medication 1 TABLET(S): at 23:46

## 2023-09-26 RX ADMIN — Medication 650 MILLIGRAM(S): at 15:59

## 2023-09-26 RX ADMIN — ENOXAPARIN SODIUM 40 MILLIGRAM(S): 100 INJECTION SUBCUTANEOUS at 06:05

## 2023-09-26 RX ADMIN — Medication 650 MILLIGRAM(S): at 16:59

## 2023-09-26 RX ADMIN — Medication 40 MILLIGRAM(S): at 13:18

## 2023-09-26 RX ADMIN — AMLODIPINE BESYLATE 10 MILLIGRAM(S): 2.5 TABLET ORAL at 06:06

## 2023-09-26 RX ADMIN — NYSTATIN CREAM 1 APPLICATION(S): 100000 CREAM TOPICAL at 21:42

## 2023-09-26 NOTE — PATIENT PROFILE ADULT - FALL HARM RISK - HARM RISK INTERVENTIONS

## 2023-09-26 NOTE — H&P ADULT - HISTORY OF PRESENT ILLNESS
83 years old female with PMHX of DM2, HTN, HLD, b/l leg swelling (on lasix), presenting to ED with  and daughter and daughter reports patient is experiencing 1 week of progressive dyspnea and then developed fever, chills with worsening entire body "shaking" since yesterday which complicated with bilateral leg pain. Patient also has nausea and headache. Patient have taken tylenol without any improvement.     ED course: Rectal temp noted to be 103.6F, HR of 101, WBC of 13.11 83 years old female with PMHX of DM2, HTN, HLD, b/l leg swelling (on lasix), presenting to ED with  and daughter and daughter reports patient is experiencing 1 week of progressive dyspnea and then developed fever, chills, AMS with worsening entire body "shaking" since yesterday which is complicated with bilateral leg pain. Daughter reports pt also has dysuria, nausea and headache associated. Patient have taken tylenol without any improvement. Admitted to medicine for UTI.     ED course: Rectal temp noted to be 103.6F, HR of 101, WBC of 13.11, positive UA. CT head negative for any acute findings. Received a dose of ceftriaxone and a bolus of IVF. Also received tylenol and a dose of lasix.  83 years old female with PMHX of DM2, HTN, HLD, b/l leg swelling (on lasix), presenting to ED with  and daughter and daughter reports patient is experiencing 1 week of progressive dyspnea and then developed fever, chills, AMS with worsening entire body "shaking" since yesterday which is complicated with bilateral leg pain. Daughter reports pt also has dysuria, nausea and headache associated. Patient have taken tylenol without any improvement. Admitted to medicine for UTI.     ED course: Rectal temp noted to be 103.6F, HR of 101, WBC of 13.11, positive UA, Na 127. CT head negative for any acute findings. Received a dose of ceftriaxone and a bolus of IVF. Also received Tylenol and a dose of lasix.  83 years old female with PMHX of DM2, HTN, HLD, b/l leg swelling (on lasix), presenting to ED with  and daughter and daughter reports patient is experiencing 1 week of progressive dyspnea and then developed fever, chills, AMS with worsening entire body "shaking" since yesterday which is complicated with bilateral leg pain. Daughter reports pt also has dysuria, nausea and headache associated. Patient have taken Tylenol without any improvement. Denies chest pain, palpitation, vomiting, diarrhea.    ED course: Rectal temp noted to be 103.6F, HR of 101, WBC of 13.11, positive UA, Na 127. CT head negative for any acute findings. Received a dose of ceftriaxone and a bolus of IVF. Also received Tylenol and a dose of lasix.  83F with PMHX of DM2, HTN, HLD, b/l leg swelling (on lasix), presenting to ED with  and daughter c/o multiple medical complaints including SOB/LEWIS x1 week as well as AMS, Fever/Chills described as "entire body shaking", and dysuria, nausea, and headache. Patient have taken Tylenol without any improvement. Denies chest pain, palpitation, vomiting, diarrhea. In ED patient noted to be confused, tachycardic, febrile with Tmax 103.6F rectal. Labs significant for leukocytosis. Patient triggered sepsis criteria and was treated with empiric IV aBX and goal directed IVF therapy. Subsequently became hypoxic with mild respiratory distress which was treated with O2 via NC and Lasix. CXR with significant BL pulmonary infiltrates bilaterally likely CHF. UA positive suspicious for UTI.     Unable to obtain ROS 2/2 AMS however remainder of ROS negative as per daughter providing HPI/ROS at bedside.

## 2023-09-26 NOTE — H&P ADULT - TIME BILLING
Imaging/Labs/Notes reviewed. Med rec confirmed and completed. Orders placed. H&P reviewed. Case discussed with medicine resident.

## 2023-09-26 NOTE — H&P ADULT - ASSESSMENT
83 years old female with PMHX of DM2, HTN, HLD, b/l leg swelling (on lasix), presenting to ED with 1 week of progressive dyspnea and then developed fever, chills, AMS with worsening entire body "shaking" since yesterday which is complicated with bilateral leg pain. Daughter reports pt also has dysuria, nausea and headache associated. Patient have taken tylenol without any improvement. Admitted for UTI.     PLAN    Metabolic encephalopathy secondary to UTI  - CT head (9/26/23): negative for any acute findings  - Abnormal UA, was given ceftriaxone in ED  - Follow Urine and Blood cultures  - Ceftriaxone for now  - Trend CBC, Chem, LFTs    Febrile seizure  - control temp with prn Tylenol  - order CPK     NIDDM  - hold metformin and glimepiride  - start on ISS     HTN  - resume amlodipine 10 mg   - resume olmesartan 40 mg     DVT ppx  - Heparin SQ 83 years old female with PMHX of DM2, HTN, HLD, b/l leg swelling (on lasix), presenting to ED with 1 week of progressive dyspnea and then developed fever, chills, AMS with worsening entire body "shaking" since yesterday which is complicated with bilateral leg pain. Daughter reports pt also has dysuria, nausea and headache associated. Patient have taken tylenol without any improvement. Admitted for UTI.     PLAN    Metabolic encephalopathy secondary to UTI  - CT head (9/26/23): negative for any acute findings  - Abnormal UA, was given ceftriaxone in ED  - Follow Urine and Blood cultures  - Ceftriaxone for now  - Trend CBC, Chem, LFTs    Febrile seizure  - control temp with prn Tylenol  - order CPK     Hyponatremia  - Will treat with NS  - Trend chem  - Correct Na not more than 8-10 Pato/24 hours    NIDDM  - hold metformin and glimepiride  - start on ISS     HTN  - resume amlodipine 10 mg   - resume olmesartan 40 mg     DVT ppx  - Heparin SQ ASSESSMENT:  83F with PMHX of DM2, HTN, HLD, b/l leg swelling (on lasix), presenting to ED with  and daughter c/o multiple medical complaints including SOB/LEWIS x1 week as well as AMS, Fever/Chills described as "entire body shaking", and dysuria, nausea, and headache admitted for Severe Sepsis 2/2 AMS 2/2 Acute Complicated UTI, Acute Hypoxic Respiratory Failure 2/2 CHF Exacerbation vs Iatrogenic Volume Overload, Hyponatremia, Fungal Dermatitis.    PLAN:  Severe Sepsis 2/2 AMS 2/2 Acute Complicated UTI  - CT head (9/26/23): negative for any acute findings  - Admit to Tele/  - Repeat Labs  - Trend WBC  - BCX x2 Pending  - UA positive. UCX pending  - Ceftriaxone 1g q24 for empiric IV ABX  - 30cc/kg IVFB NSS given in ED hold further IVF with volume overload/possible CHF exacerbation/worsening hypoxia iwth negative lactate and normal/hypertensive BP    Acute Hypoxic respiratory Failure 2/2 Acute CHF Exacebration v Iatrogenic Volume Overload  -CXR +BL Infiltratres likely pulm vascular congestion/acute pulm edema  -Hypoxia acutely worsened after 30cc/kg Bolus in ED  -Hold furhter IVF  - Lasix 40mg IV BID  -CHeck TTE  -Fluid Restriction  -Check Cardiac Enzymes    Hyponatremia  - s/p 30cc/kg IVFB in ED for sepsis  - Hold further IVF  - Suspect component of dilutional hyponatremia 2/2 volume overload  - Repeat BMP/Trend BMP q4 for Na correction  -Check Serum OSM  -Check Urine Studies  -Goal Na Correction 6-8meq/24 hours    NIDDM  - hold metformin and glimepiride  - ISS/Accuchecks/A1C     HTN  - amlodipine 10 mg   - olmesartan 40 mg   - Hold HCTZ and Lasix PO unclear why patient si on both meds at home   - See Lasix Above    Fungal Dermatitis  -Nystatin BID Groin    DVT ppx  - SCDs/LMWH 40mg q24

## 2023-09-26 NOTE — H&P ADULT - NSHPPHYSICALEXAM_GEN_ALL_CORE
PHYSICAL EXAM:  Constitutional: Alert, interactive, in no acute distress  Head and Face: Atraumatic, head and face were normal in appearance  Eyes: Sclera and conjunctiva were normal, pupils were equal in size, round, eyelids normal  ENT: Ears and nose were normal in appearance  Neck: Appearance was normal, neck was supple, No JVD  Pulmonary: Clear to auscultation bilaterally, no rales/crackles, or wheezing  Heart: Regular rate and rhythm, no murmurs, gallops, or pericardial rubs  Abdominal: Soft, nontender, nondistended. Bowel sounds normal  Skin: Normal color and intact with + inner thighs fold rash  Extremities: Warm without edema.   Pulse: 2+ radial pulse  Neuro: Oriented to person, place, and time Vital Signs Last 24 Hrs  T(C): 36.7 (26 Sep 2023 03:04), Max: 39.8 (25 Sep 2023 22:15)  T(F): 98 (26 Sep 2023 03:04), Max: 103.6 (25 Sep 2023 22:15)  HR: 84 (26 Sep 2023 03:04) (84 - 101)  BP: 133/68 (26 Sep 2023 03:04) (124/60 - 180/76)  BP(mean): --  RR: 20 (26 Sep 2023 03:04) (19 - 20)  SpO2: 94% (26 Sep 2023 03:04) (94% - 96%)    Parameters below as of 26 Sep 2023 03:04  Patient On (Oxygen Delivery Method): room air    PHYSICAL EXAM:  Constitutional: Alert, interactive, in no acute distress  Head and Face: Atraumatic, head and face were normal in appearance  Eyes: Sclera and conjunctiva were normal, pupils were equal in size, round, eyelids normal  ENT: Ears and nose were normal in appearance  Neck: Appearance was normal, neck was supple, No JVD  Pulmonary: Clear to auscultation bilaterally, no rales/crackles, or wheezing  Heart: Regular rate and rhythm, no murmurs, gallops, or pericardial rubs  Abdominal: Soft, nontender, nondistended. Bowel sounds normal  Skin: Normal color and intact with + inner thighs fold rash  Extremities: Warm without edema.   Pulse: 2+ radial pulse  Neuro: Oriented to person, place, and time, +confused

## 2023-09-26 NOTE — CHART NOTE - NSCHARTNOTEFT_GEN_A_CORE
Patient was seen and examined at bedside around 9:15 am with  - Nick.  Feels better now.  Has increased urinary frequency but no other urinary symptoms.  Complaining of musculoskeletal pain in right thigh.   Still requiring supplemental oxygen. No respiratory distress.  Wean off supplemental oxygen as tolerated.  ECHO pending.    Continue current treatment.  Please refer to H&P from earlier today for more details. Patient was seen and examined at bedside around 9:15 am with  - Nick.  Feels better now.  Has increased urinary frequency but no other urinary symptoms.  Complaining of musculoskeletal pain in right thigh.   Still requiring supplemental oxygen. No respiratory distress.  Wean off supplemental oxygen as tolerated.  ECHO pending.  Blood Cultures with GNR.  Repeat blood cultures.  Will get CT Abd / Pelvis.   Increase Rocephin to 2 gm IVP daily.     Continue current treatment.  Please refer to H&P from earlier today for more details.

## 2023-09-26 NOTE — H&P ADULT - ATTENDING COMMENTS
Changes made to above resident documentation and H&P as seen below:    ASSESSMENT:  83F with PMHX of DM2, HTN, HLD, b/l leg swelling (on lasix), presenting to ED with  and daughter c/o multiple medical complaints including SOB/LEWIS x1 week as well as AMS, Fever/Chills described as "entire body shaking", and dysuria, nausea, and headache admitted for Severe Sepsis 2/2 AMS 2/2 Acute Complicated UTI, Acute Hypoxic Respiratory Failure 2/2 CHF Exacerbation vs Iatrogenic Volume Overload, Hyponatremia, Fungal Dermatitis.    PLAN:  Severe Sepsis 2/2 AMS 2/2 Acute Complicated UTI  - CT head (9/26/23): negative for any acute findings  - Admit to Tele/  - Repeat Labs  - Trend WBC  - BCX x2 Pending  - UA positive. UCX pending  - Ceftriaxone 1g q24 for empiric IV ABX  - 30cc/kg IVFB NSS given in ED hold further IVF with volume overload/possible CHF exacerbation/worsening hypoxia iwth negative lactate and normal/hypertensive BP    Acute Hypoxic respiratory Failure 2/2 Acute CHF Exacebration v Iatrogenic Volume Overload  -CXR +BL Infiltratres likely pulm vascular congestion/acute pulm edema  -Hypoxia acutely worsened after 30cc/kg Bolus in ED  -Hold furhter IVF  - Lasix 40mg IV BID  -CHeck TTE  -Fluid Restriction  -Check Cardiac Enzymes    Hyponatremia  - s/p 30cc/kg IVFB in ED for sepsis  - Hold further IVF  - Suspect component of dilutional hyponatremia 2/2 volume overload  - Repeat BMP/Trend BMP q4 for Na correction  -Check Serum OSM  -Check Urine Studies  -Goal Na Correction 6-8meq/24 hours

## 2023-09-27 LAB
A1C WITH ESTIMATED AVERAGE GLUCOSE RESULT: 7.4 % — HIGH (ref 4–5.6)
ANION GAP SERPL CALC-SCNC: 10 MMOL/L — SIGNIFICANT CHANGE UP (ref 5–17)
BASOPHILS # BLD AUTO: 0.02 K/UL — SIGNIFICANT CHANGE UP (ref 0–0.2)
BASOPHILS NFR BLD AUTO: 0.2 % — SIGNIFICANT CHANGE UP (ref 0–2)
BUN SERPL-MCNC: 15.8 MG/DL — SIGNIFICANT CHANGE UP (ref 8–20)
CALCIUM SERPL-MCNC: 8.9 MG/DL — SIGNIFICANT CHANGE UP (ref 8.4–10.5)
CHLORIDE SERPL-SCNC: 95 MMOL/L — LOW (ref 96–108)
CO2 SERPL-SCNC: 26 MMOL/L — SIGNIFICANT CHANGE UP (ref 22–29)
CREAT SERPL-MCNC: 0.75 MG/DL — SIGNIFICANT CHANGE UP (ref 0.5–1.3)
EGFR: 80 ML/MIN/1.73M2 — SIGNIFICANT CHANGE UP
EOSINOPHIL # BLD AUTO: 0.07 K/UL — SIGNIFICANT CHANGE UP (ref 0–0.5)
EOSINOPHIL NFR BLD AUTO: 0.8 % — SIGNIFICANT CHANGE UP (ref 0–6)
ESTIMATED AVERAGE GLUCOSE: 166 MG/DL — HIGH (ref 68–114)
GLUCOSE BLDC GLUCOMTR-MCNC: 104 MG/DL — HIGH (ref 70–99)
GLUCOSE BLDC GLUCOMTR-MCNC: 110 MG/DL — HIGH (ref 70–99)
GLUCOSE BLDC GLUCOMTR-MCNC: 136 MG/DL — HIGH (ref 70–99)
GLUCOSE BLDC GLUCOMTR-MCNC: 85 MG/DL — SIGNIFICANT CHANGE UP (ref 70–99)
GLUCOSE SERPL-MCNC: 77 MG/DL — SIGNIFICANT CHANGE UP (ref 70–99)
HCT VFR BLD CALC: 29 % — LOW (ref 34.5–45)
HGB BLD-MCNC: 10.2 G/DL — LOW (ref 11.5–15.5)
IMM GRANULOCYTES NFR BLD AUTO: 0.6 % — SIGNIFICANT CHANGE UP (ref 0–0.9)
LYMPHOCYTES # BLD AUTO: 0.98 K/UL — LOW (ref 1–3.3)
LYMPHOCYTES # BLD AUTO: 10.9 % — LOW (ref 13–44)
MAGNESIUM SERPL-MCNC: 1.9 MG/DL — SIGNIFICANT CHANGE UP (ref 1.6–2.6)
MANUAL SMEAR VERIFICATION: SIGNIFICANT CHANGE UP
MCHC RBC-ENTMCNC: 28.8 PG — SIGNIFICANT CHANGE UP (ref 27–34)
MCHC RBC-ENTMCNC: 35.2 GM/DL — SIGNIFICANT CHANGE UP (ref 32–36)
MCV RBC AUTO: 81.9 FL — SIGNIFICANT CHANGE UP (ref 80–100)
MONOCYTES # BLD AUTO: 1.01 K/UL — HIGH (ref 0–0.9)
MONOCYTES NFR BLD AUTO: 11.3 % — SIGNIFICANT CHANGE UP (ref 2–14)
NEUTROPHILS # BLD AUTO: 6.82 K/UL — SIGNIFICANT CHANGE UP (ref 1.8–7.4)
NEUTROPHILS NFR BLD AUTO: 76.2 % — SIGNIFICANT CHANGE UP (ref 43–77)
PHOSPHATE SERPL-MCNC: 3.3 MG/DL — SIGNIFICANT CHANGE UP (ref 2.4–4.7)
PLAT MORPH BLD: NORMAL — SIGNIFICANT CHANGE UP
PLATELET # BLD AUTO: 176 K/UL — SIGNIFICANT CHANGE UP (ref 150–400)
POTASSIUM SERPL-MCNC: 3.4 MMOL/L — LOW (ref 3.5–5.3)
POTASSIUM SERPL-SCNC: 3.4 MMOL/L — LOW (ref 3.5–5.3)
RBC # BLD: 3.54 M/UL — LOW (ref 3.8–5.2)
RBC # FLD: 13.3 % — SIGNIFICANT CHANGE UP (ref 10.3–14.5)
RBC BLD AUTO: NORMAL — SIGNIFICANT CHANGE UP
SODIUM SERPL-SCNC: 131 MMOL/L — LOW (ref 135–145)
WBC # BLD: 8.95 K/UL — SIGNIFICANT CHANGE UP (ref 3.8–10.5)
WBC # FLD AUTO: 8.95 K/UL — SIGNIFICANT CHANGE UP (ref 3.8–10.5)

## 2023-09-27 PROCEDURE — 74177 CT ABD & PELVIS W/CONTRAST: CPT | Mod: 26

## 2023-09-27 PROCEDURE — 99233 SBSQ HOSP IP/OBS HIGH 50: CPT

## 2023-09-27 RX ORDER — POTASSIUM CHLORIDE 20 MEQ
40 PACKET (EA) ORAL EVERY 4 HOURS
Refills: 0 | Status: COMPLETED | OUTPATIENT
Start: 2023-09-27 | End: 2023-09-27

## 2023-09-27 RX ADMIN — ENOXAPARIN SODIUM 40 MILLIGRAM(S): 100 INJECTION SUBCUTANEOUS at 05:29

## 2023-09-27 RX ADMIN — NYSTATIN CREAM 1 APPLICATION(S): 100000 CREAM TOPICAL at 13:10

## 2023-09-27 RX ADMIN — CEFTRIAXONE 2000 MILLIGRAM(S): 500 INJECTION, POWDER, FOR SOLUTION INTRAMUSCULAR; INTRAVENOUS at 11:28

## 2023-09-27 RX ADMIN — LOSARTAN POTASSIUM 100 MILLIGRAM(S): 100 TABLET, FILM COATED ORAL at 05:28

## 2023-09-27 RX ADMIN — Medication 650 MILLIGRAM(S): at 00:48

## 2023-09-27 RX ADMIN — Medication 40 MILLIGRAM(S): at 05:29

## 2023-09-27 RX ADMIN — Medication 40 MILLIGRAM(S): at 13:08

## 2023-09-27 RX ADMIN — NYSTATIN CREAM 1 APPLICATION(S): 100000 CREAM TOPICAL at 23:02

## 2023-09-27 RX ADMIN — AMLODIPINE BESYLATE 10 MILLIGRAM(S): 2.5 TABLET ORAL at 05:29

## 2023-09-27 RX ADMIN — Medication 650 MILLIGRAM(S): at 17:10

## 2023-09-27 RX ADMIN — Medication 1 TABLET(S): at 05:28

## 2023-09-27 RX ADMIN — Medication 40 MILLIEQUIVALENT(S): at 11:28

## 2023-09-27 RX ADMIN — Medication 650 MILLIGRAM(S): at 23:02

## 2023-09-27 RX ADMIN — Medication 40 MILLIEQUIVALENT(S): at 13:08

## 2023-09-27 RX ADMIN — Medication 650 MILLIGRAM(S): at 16:10

## 2023-09-27 RX ADMIN — NYSTATIN CREAM 1 APPLICATION(S): 100000 CREAM TOPICAL at 05:29

## 2023-09-27 NOTE — PROGRESS NOTE ADULT - ASSESSMENT
83F with PMHX of DM2, HTN, HLD, b/l leg swelling (on lasix), presenting to ED with  and daughter c/o multiple medical complaints including SOB/LEWIS x1 week as well as AMS, Fever/Chills described as "entire body shaking", and dysuria, nausea, and headache.    Sepsis   - Likely due to UTI   - Urine and blood cultures with E. Coli, sensitivity pending   - Follow repeat blood cultures  - Continue Rocephin 2 gm IVP daily   - CT Abd / Pelvis pending     2) Metabolic Encephalopathy   - Likely due to above   - CT head (9/26/23): negative for any acute findings  - Resolved     Acute Hypoxic respiratory Failure   - Likely due to volume overload from fluid resuscitation for Sepsis  - ECHO with normal EF. No Acute Heart Failure.   - s/p Lasix   - Weaned off supplemental oxygen     Hyponatremia  - Improving     DM 2  - A1c 7.4  - Accu checks and ISS    HTN  Continue Losartan and Amlodipine     Hypokalemia / Hypophosphatemia   Repleted    DVT Prophylaxis -- Lovenox SQ    Dispo: Home once stable.    Updated patient's  at bedside.   83F with PMHX of DM2, HTN, HLD, b/l leg swelling (on lasix), presenting to ED with  and daughter c/o multiple medical complaints including SOB/LEWIS x1 week as well as AMS, Fever/Chills described as "entire body shaking", and dysuria, nausea, and headache.    Sepsis   - Likely due to UTI   - Urine and blood cultures with E. Coli, sensitivity pending   - Follow repeat blood cultures  - Continue Rocephin 2 gm IVP daily   - CT Abd / Pelvis pending     Metabolic Encephalopathy   - Likely due to above   - CT head (9/26/23): negative for any acute findings  - Resolved     Acute Hypoxic respiratory Failure   - Likely due to volume overload from fluid resuscitation for Sepsis  - ECHO with normal EF. No Acute Heart Failure.   - s/p Lasix   - Weaned off supplemental oxygen     Hyponatremia  - Improving     DM 2  - A1c 7.4  - Accu checks and ISS    HTN  Continue Losartan and Amlodipine     Hypokalemia / Hypophosphatemia   Repleted    DVT Prophylaxis -- Lovenox SQ    Dispo: Home once stable.    Updated patient's  at bedside.

## 2023-09-28 LAB
-  AMIKACIN: SIGNIFICANT CHANGE UP
-  AMIKACIN: SIGNIFICANT CHANGE UP
-  AMOXICILLIN/CLAVULANIC ACID: SIGNIFICANT CHANGE UP
-  AMPICILLIN/SULBACTAM: SIGNIFICANT CHANGE UP
-  AMPICILLIN/SULBACTAM: SIGNIFICANT CHANGE UP
-  AMPICILLIN: SIGNIFICANT CHANGE UP
-  AMPICILLIN: SIGNIFICANT CHANGE UP
-  AZTREONAM: SIGNIFICANT CHANGE UP
-  AZTREONAM: SIGNIFICANT CHANGE UP
-  CEFAZOLIN: SIGNIFICANT CHANGE UP
-  CEFAZOLIN: SIGNIFICANT CHANGE UP
-  CEFEPIME: SIGNIFICANT CHANGE UP
-  CEFEPIME: SIGNIFICANT CHANGE UP
-  CEFOXITIN: SIGNIFICANT CHANGE UP
-  CEFOXITIN: SIGNIFICANT CHANGE UP
-  CEFTRIAXONE: SIGNIFICANT CHANGE UP
-  CEFTRIAXONE: SIGNIFICANT CHANGE UP
-  CEFUROXIME: SIGNIFICANT CHANGE UP
-  CIPROFLOXACIN: SIGNIFICANT CHANGE UP
-  CIPROFLOXACIN: SIGNIFICANT CHANGE UP
-  ERTAPENEM: SIGNIFICANT CHANGE UP
-  ERTAPENEM: SIGNIFICANT CHANGE UP
-  GENTAMICIN: SIGNIFICANT CHANGE UP
-  GENTAMICIN: SIGNIFICANT CHANGE UP
-  IMIPENEM: SIGNIFICANT CHANGE UP
-  IMIPENEM: SIGNIFICANT CHANGE UP
-  LEVOFLOXACIN: SIGNIFICANT CHANGE UP
-  LEVOFLOXACIN: SIGNIFICANT CHANGE UP
-  MEROPENEM: SIGNIFICANT CHANGE UP
-  MEROPENEM: SIGNIFICANT CHANGE UP
-  NITROFURANTOIN: SIGNIFICANT CHANGE UP
-  PIPERACILLIN/TAZOBACTAM: SIGNIFICANT CHANGE UP
-  PIPERACILLIN/TAZOBACTAM: SIGNIFICANT CHANGE UP
-  TOBRAMYCIN: SIGNIFICANT CHANGE UP
-  TOBRAMYCIN: SIGNIFICANT CHANGE UP
-  TRIMETHOPRIM/SULFAMETHOXAZOLE: SIGNIFICANT CHANGE UP
-  TRIMETHOPRIM/SULFAMETHOXAZOLE: SIGNIFICANT CHANGE UP
ANION GAP SERPL CALC-SCNC: 12 MMOL/L — SIGNIFICANT CHANGE UP (ref 5–17)
BASOPHILS # BLD AUTO: 0.03 K/UL — SIGNIFICANT CHANGE UP (ref 0–0.2)
BASOPHILS NFR BLD AUTO: 0.4 % — SIGNIFICANT CHANGE UP (ref 0–2)
BUN SERPL-MCNC: 16.6 MG/DL — SIGNIFICANT CHANGE UP (ref 8–20)
CALCIUM SERPL-MCNC: 9.4 MG/DL — SIGNIFICANT CHANGE UP (ref 8.4–10.5)
CHLORIDE SERPL-SCNC: 96 MMOL/L — SIGNIFICANT CHANGE UP (ref 96–108)
CO2 SERPL-SCNC: 25 MMOL/L — SIGNIFICANT CHANGE UP (ref 22–29)
CREAT SERPL-MCNC: 0.75 MG/DL — SIGNIFICANT CHANGE UP (ref 0.5–1.3)
CULTURE RESULTS: SIGNIFICANT CHANGE UP
EGFR: 80 ML/MIN/1.73M2 — SIGNIFICANT CHANGE UP
EOSINOPHIL # BLD AUTO: 0.15 K/UL — SIGNIFICANT CHANGE UP (ref 0–0.5)
EOSINOPHIL NFR BLD AUTO: 2.2 % — SIGNIFICANT CHANGE UP (ref 0–6)
GLUCOSE BLDC GLUCOMTR-MCNC: 158 MG/DL — HIGH (ref 70–99)
GLUCOSE BLDC GLUCOMTR-MCNC: 171 MG/DL — HIGH (ref 70–99)
GLUCOSE BLDC GLUCOMTR-MCNC: 174 MG/DL — HIGH (ref 70–99)
GLUCOSE BLDC GLUCOMTR-MCNC: 192 MG/DL — HIGH (ref 70–99)
GLUCOSE SERPL-MCNC: 128 MG/DL — HIGH (ref 70–99)
HCT VFR BLD CALC: 30.5 % — LOW (ref 34.5–45)
HGB BLD-MCNC: 10.4 G/DL — LOW (ref 11.5–15.5)
IMM GRANULOCYTES NFR BLD AUTO: 0.9 % — SIGNIFICANT CHANGE UP (ref 0–0.9)
LYMPHOCYTES # BLD AUTO: 1.12 K/UL — SIGNIFICANT CHANGE UP (ref 1–3.3)
LYMPHOCYTES # BLD AUTO: 16.7 % — SIGNIFICANT CHANGE UP (ref 13–44)
MAGNESIUM SERPL-MCNC: 1.9 MG/DL — SIGNIFICANT CHANGE UP (ref 1.8–2.6)
MCHC RBC-ENTMCNC: 28.5 PG — SIGNIFICANT CHANGE UP (ref 27–34)
MCHC RBC-ENTMCNC: 34.1 GM/DL — SIGNIFICANT CHANGE UP (ref 32–36)
MCV RBC AUTO: 83.6 FL — SIGNIFICANT CHANGE UP (ref 80–100)
METHOD TYPE: SIGNIFICANT CHANGE UP
METHOD TYPE: SIGNIFICANT CHANGE UP
MONOCYTES # BLD AUTO: 0.86 K/UL — SIGNIFICANT CHANGE UP (ref 0–0.9)
MONOCYTES NFR BLD AUTO: 12.8 % — SIGNIFICANT CHANGE UP (ref 2–14)
NEUTROPHILS # BLD AUTO: 4.48 K/UL — SIGNIFICANT CHANGE UP (ref 1.8–7.4)
NEUTROPHILS NFR BLD AUTO: 67 % — SIGNIFICANT CHANGE UP (ref 43–77)
ORGANISM # SPEC MICROSCOPIC CNT: SIGNIFICANT CHANGE UP
PLATELET # BLD AUTO: 226 K/UL — SIGNIFICANT CHANGE UP (ref 150–400)
POTASSIUM SERPL-MCNC: 4.6 MMOL/L — SIGNIFICANT CHANGE UP (ref 3.5–5.3)
POTASSIUM SERPL-SCNC: 4.6 MMOL/L — SIGNIFICANT CHANGE UP (ref 3.5–5.3)
RBC # BLD: 3.65 M/UL — LOW (ref 3.8–5.2)
RBC # FLD: 13.8 % — SIGNIFICANT CHANGE UP (ref 10.3–14.5)
SODIUM SERPL-SCNC: 133 MMOL/L — LOW (ref 135–145)
SPECIMEN SOURCE: SIGNIFICANT CHANGE UP
WBC # BLD: 6.7 K/UL — SIGNIFICANT CHANGE UP (ref 3.8–10.5)
WBC # FLD AUTO: 6.7 K/UL — SIGNIFICANT CHANGE UP (ref 3.8–10.5)

## 2023-09-28 PROCEDURE — 99233 SBSQ HOSP IP/OBS HIGH 50: CPT

## 2023-09-28 RX ADMIN — NYSTATIN CREAM 1 APPLICATION(S): 100000 CREAM TOPICAL at 12:41

## 2023-09-28 RX ADMIN — NYSTATIN CREAM 1 APPLICATION(S): 100000 CREAM TOPICAL at 09:57

## 2023-09-28 RX ADMIN — LOSARTAN POTASSIUM 100 MILLIGRAM(S): 100 TABLET, FILM COATED ORAL at 05:55

## 2023-09-28 RX ADMIN — Medication 650 MILLIGRAM(S): at 17:01

## 2023-09-28 RX ADMIN — ENOXAPARIN SODIUM 40 MILLIGRAM(S): 100 INJECTION SUBCUTANEOUS at 05:56

## 2023-09-28 RX ADMIN — Medication 1: at 09:56

## 2023-09-28 RX ADMIN — Medication 650 MILLIGRAM(S): at 05:55

## 2023-09-28 RX ADMIN — Medication 100 MILLIGRAM(S): at 11:55

## 2023-09-28 RX ADMIN — Medication 650 MILLIGRAM(S): at 17:31

## 2023-09-28 RX ADMIN — Medication 1: at 17:02

## 2023-09-28 RX ADMIN — Medication 1: at 12:40

## 2023-09-28 RX ADMIN — AMLODIPINE BESYLATE 10 MILLIGRAM(S): 2.5 TABLET ORAL at 05:55

## 2023-09-28 RX ADMIN — CEFTRIAXONE 2000 MILLIGRAM(S): 500 INJECTION, POWDER, FOR SOLUTION INTRAMUSCULAR; INTRAVENOUS at 11:55

## 2023-09-28 RX ADMIN — Medication 650 MILLIGRAM(S): at 06:25

## 2023-09-28 RX ADMIN — Medication 650 MILLIGRAM(S): at 00:10

## 2023-09-28 RX ADMIN — Medication 100 MILLIGRAM(S): at 17:56

## 2023-09-28 NOTE — DIETITIAN INITIAL EVALUATION ADULT - PERTINENT MEDS FT
MEDICATIONS  (STANDING):  amLODIPine   Tablet 10 milliGRAM(s) Oral daily  cefTRIAXone Injectable. 2000 milliGRAM(s) IV Push every 24 hours  dextrose 5%. 1000 milliLiter(s) (50 mL/Hr) IV Continuous <Continuous>  dextrose 5%. 1000 milliLiter(s) (100 mL/Hr) IV Continuous <Continuous>  dextrose 50% Injectable 12.5 Gram(s) IV Push once  dextrose 50% Injectable 25 Gram(s) IV Push once  dextrose 50% Injectable 25 Gram(s) IV Push once  enoxaparin Injectable 40 milliGRAM(s) SubCutaneous every 24 hours  glucagon  Injectable 1 milliGRAM(s) IntraMuscular once  influenza  Vaccine (HIGH DOSE) 0.7 milliLiter(s) IntraMuscular once  insulin lispro (ADMELOG) corrective regimen sliding scale   SubCutaneous Before meals and at bedtime  losartan 100 milliGRAM(s) Oral daily  nystatin Powder 1 Application(s) Topical three times a day    MEDICATIONS  (PRN):  acetaminophen     Tablet .. 650 milliGRAM(s) Oral every 6 hours PRN Temp greater or equal to 38C (100.4F), Mild Pain (1 - 3)  aluminum hydroxide/magnesium hydroxide/simethicone Suspension 30 milliLiter(s) Oral every 4 hours PRN Dyspepsia  dextrose Oral Gel 15 Gram(s) Oral once PRN Blood Glucose LESS THAN 70 milliGRAM(s)/deciliter  melatonin 3 milliGRAM(s) Oral at bedtime PRN Insomnia  ondansetron Injectable 4 milliGRAM(s) IV Push every 8 hours PRN Nausea and/or Vomiting

## 2023-09-28 NOTE — DIETITIAN NUTRITION RISK NOTIFICATION - TREATMENT: THE FOLLOWING DIET HAS BEEN RECOMMENDED
Diet, DASH/TLC:   Sodium & Cholesterol Restricted  Consistent Carbohydrate {Evening Snack} (CSTCHOSN)  1200mL Fluid Restriction ([id number]) (09-26-23 @ 04:36) [Active]

## 2023-09-28 NOTE — DIETITIAN INITIAL EVALUATION ADULT - NS FNS DIET ORDER
Diet, DASH/TLC:   Sodium & Cholesterol Restricted  Consistent Carbohydrate {Evening Snack} (CSTCHOSN)  1200mL Fluid Restriction ([id number]) (09-26-23 @ 04:36)

## 2023-09-28 NOTE — PROGRESS NOTE ADULT - ASSESSMENT
83F with PMHX of DM2, HTN, HLD, b/l leg swelling (on lasix), presenting to ED with  and daughter c/o multiple medical complaints including SOB/LEWIS x1 week as well as AMS, Fever/Chills described as "entire body shaking", and dysuria, nausea, and headache.    Sepsis   - Likely due to UTI   - Urine and blood cultures with E. Coli  - Follow repeat blood cultures, negative so far   - Continue Rocephin 2 gm IVP daily   - CT Abd / Pelvis noted     Metabolic Encephalopathy   - Likely due to above   - CT head (9/26/23): negative for any acute findings  - Resolved     Acute Hypoxic respiratory Failure   - Likely due to volume overload from fluid resuscitation for Sepsis  - ECHO with normal EF. No Acute Heart Failure.   - s/p Lasix   - Weaned off supplemental oxygen     Hyponatremia  - Improving     DM 2  - A1c 7.4  - Accu checks and ISS    HTN  - Continue Losartan and Amlodipine     Hypokalemia / Hypophosphatemia   - Repleted    Liver Lesions  - No history of malignancy   - MRI Abdomen  - GI Consult     DVT Prophylaxis -- Lovenox SQ    Dispo: Home once stable.    Updated patient's  and daughter at bedside.

## 2023-09-28 NOTE — DIETITIAN INITIAL EVALUATION ADULT - ADD RECOMMEND
1) Rx: ensure enlive TID (+1050cal, 60g pro)   2) Encourage po intake, monitor diet tolerance, and provide assistance at meals as needed  Obtain daily weights to monitor trends  Monitor nutrition related labs; vandana, glucose

## 2023-09-28 NOTE — DIETITIAN INITIAL EVALUATION ADULT - OTHER INFO
83F with PMHX of DM2, HTN, HLD, b/l leg swelling (on lasix), presenting to ED with  and daughter c/o multiple medical complaints including SOB/LEWIS x1 week as well as AMS, Fever/Chills described as "entire body shaking", and dysuria, nausea, and headache.

## 2023-09-28 NOTE — DIETITIAN INITIAL EVALUATION ADULT - ORAL INTAKE PTA/DIET HISTORY
RD consult completed in room, pt standing unable to obtain bed scale weight. Pt states her weight was ~170lbs, currently thinks her weight is ~160lbs. Pt states she has little appetite and would like a vanilla ensure TID. Pt drinks ensure at home and would like to continue with them at facility. Reinforced pts diet with fluid restriction.  RD to remain available

## 2023-09-28 NOTE — DIETITIAN INITIAL EVALUATION ADULT - PERTINENT LABORATORY DATA
09-28    133<L>  |  96  |  16.6  ----------------------------<  128<H>  4.6   |  25.0  |  0.75    Ca    9.4      28 Sep 2023 03:21  Phos  3.3     09-27  Mg     1.9     09-28    POCT Blood Glucose.: 171 mg/dL (09-28-23 @ 09:34)  A1C with Estimated Average Glucose Result: 7.4 % (09-27-23 @ 04:33)  09-28 Na133 mmol/L<L> Glu 128 mg/dL<H> K+ 4.6 mmol/L Cr  0.75 mg/dL BUN 16.6 mg/dL Phos n/a   Alb n/a   PAB n/a

## 2023-09-29 DIAGNOSIS — R93.89 ABNORMAL FINDINGS ON DIAGNOSTIC IMAGING OF OTHER SPECIFIED BODY STRUCTURES: ICD-10-CM

## 2023-09-29 LAB
ANION GAP SERPL CALC-SCNC: 10 MMOL/L — SIGNIFICANT CHANGE UP (ref 5–17)
ANISOCYTOSIS BLD QL: SLIGHT — SIGNIFICANT CHANGE UP
BASOPHILS # BLD AUTO: 0 K/UL — SIGNIFICANT CHANGE UP (ref 0–0.2)
BASOPHILS NFR BLD AUTO: 0 % — SIGNIFICANT CHANGE UP (ref 0–2)
BUN SERPL-MCNC: 12.7 MG/DL — SIGNIFICANT CHANGE UP (ref 8–20)
CALCIUM SERPL-MCNC: 9.7 MG/DL — SIGNIFICANT CHANGE UP (ref 8.4–10.5)
CHLORIDE SERPL-SCNC: 98 MMOL/L — SIGNIFICANT CHANGE UP (ref 96–108)
CO2 SERPL-SCNC: 26 MMOL/L — SIGNIFICANT CHANGE UP (ref 22–29)
CREAT SERPL-MCNC: 0.62 MG/DL — SIGNIFICANT CHANGE UP (ref 0.5–1.3)
EGFR: 89 ML/MIN/1.73M2 — SIGNIFICANT CHANGE UP
EOSINOPHIL # BLD AUTO: 0.06 K/UL — SIGNIFICANT CHANGE UP (ref 0–0.5)
EOSINOPHIL NFR BLD AUTO: 1 % — SIGNIFICANT CHANGE UP (ref 0–6)
GLUCOSE BLDC GLUCOMTR-MCNC: 131 MG/DL — HIGH (ref 70–99)
GLUCOSE BLDC GLUCOMTR-MCNC: 146 MG/DL — HIGH (ref 70–99)
GLUCOSE BLDC GLUCOMTR-MCNC: 155 MG/DL — HIGH (ref 70–99)
GLUCOSE BLDC GLUCOMTR-MCNC: 165 MG/DL — HIGH (ref 70–99)
GLUCOSE SERPL-MCNC: 136 MG/DL — HIGH (ref 70–99)
HCT VFR BLD CALC: 32.2 % — LOW (ref 34.5–45)
HGB BLD-MCNC: 10.6 G/DL — LOW (ref 11.5–15.5)
HYPOCHROMIA BLD QL: SLIGHT — SIGNIFICANT CHANGE UP
LYMPHOCYTES # BLD AUTO: 0.97 K/UL — LOW (ref 1–3.3)
LYMPHOCYTES # BLD AUTO: 17 % — SIGNIFICANT CHANGE UP (ref 13–44)
MAGNESIUM SERPL-MCNC: 2.1 MG/DL — SIGNIFICANT CHANGE UP (ref 1.8–2.6)
MANUAL SMEAR VERIFICATION: SIGNIFICANT CHANGE UP
MCHC RBC-ENTMCNC: 28 PG — SIGNIFICANT CHANGE UP (ref 27–34)
MCHC RBC-ENTMCNC: 32.9 GM/DL — SIGNIFICANT CHANGE UP (ref 32–36)
MCV RBC AUTO: 85 FL — SIGNIFICANT CHANGE UP (ref 80–100)
METAMYELOCYTES # FLD: 1 % — HIGH (ref 0–0)
MICROCYTES BLD QL: SLIGHT — SIGNIFICANT CHANGE UP
MONOCYTES # BLD AUTO: 0.51 K/UL — SIGNIFICANT CHANGE UP (ref 0–0.9)
MONOCYTES NFR BLD AUTO: 9 % — SIGNIFICANT CHANGE UP (ref 2–14)
MYELOCYTES NFR BLD: 1 % — HIGH (ref 0–0)
NEUTROPHILS # BLD AUTO: 4.05 K/UL — SIGNIFICANT CHANGE UP (ref 1.8–7.4)
NEUTROPHILS NFR BLD AUTO: 67 % — SIGNIFICANT CHANGE UP (ref 43–77)
NEUTS BAND # BLD: 4 % — SIGNIFICANT CHANGE UP (ref 0–8)
NRBC # BLD: 0 /100 — SIGNIFICANT CHANGE UP (ref 0–0)
PLAT MORPH BLD: NORMAL — SIGNIFICANT CHANGE UP
PLATELET # BLD AUTO: 230 K/UL — SIGNIFICANT CHANGE UP (ref 150–400)
POTASSIUM SERPL-MCNC: 4.8 MMOL/L — SIGNIFICANT CHANGE UP (ref 3.5–5.3)
POTASSIUM SERPL-SCNC: 4.8 MMOL/L — SIGNIFICANT CHANGE UP (ref 3.5–5.3)
RBC # BLD: 3.79 M/UL — LOW (ref 3.8–5.2)
RBC # FLD: 13.8 % — SIGNIFICANT CHANGE UP (ref 10.3–14.5)
RBC BLD AUTO: ABNORMAL
SODIUM SERPL-SCNC: 134 MMOL/L — LOW (ref 135–145)
WBC # BLD: 5.7 K/UL — SIGNIFICANT CHANGE UP (ref 3.8–10.5)
WBC # FLD AUTO: 5.7 K/UL — SIGNIFICANT CHANGE UP (ref 3.8–10.5)

## 2023-09-29 PROCEDURE — 99233 SBSQ HOSP IP/OBS HIGH 50: CPT

## 2023-09-29 PROCEDURE — 74183 MRI ABD W/O CNTR FLWD CNTR: CPT | Mod: 26

## 2023-09-29 PROCEDURE — 99223 1ST HOSP IP/OBS HIGH 75: CPT

## 2023-09-29 RX ADMIN — Medication 650 MILLIGRAM(S): at 10:37

## 2023-09-29 RX ADMIN — Medication 100 MILLIGRAM(S): at 21:25

## 2023-09-29 RX ADMIN — NYSTATIN CREAM 1 APPLICATION(S): 100000 CREAM TOPICAL at 00:16

## 2023-09-29 RX ADMIN — Medication 650 MILLIGRAM(S): at 07:29

## 2023-09-29 RX ADMIN — Medication 650 MILLIGRAM(S): at 01:17

## 2023-09-29 RX ADMIN — Medication 1: at 12:57

## 2023-09-29 RX ADMIN — Medication 1: at 21:23

## 2023-09-29 RX ADMIN — Medication 650 MILLIGRAM(S): at 11:07

## 2023-09-29 RX ADMIN — Medication 100 MILLIGRAM(S): at 12:57

## 2023-09-29 RX ADMIN — Medication 650 MILLIGRAM(S): at 22:26

## 2023-09-29 RX ADMIN — LOSARTAN POTASSIUM 100 MILLIGRAM(S): 100 TABLET, FILM COATED ORAL at 06:12

## 2023-09-29 RX ADMIN — CEFTRIAXONE 2000 MILLIGRAM(S): 500 INJECTION, POWDER, FOR SOLUTION INTRAMUSCULAR; INTRAVENOUS at 10:38

## 2023-09-29 RX ADMIN — NYSTATIN CREAM 1 APPLICATION(S): 100000 CREAM TOPICAL at 21:26

## 2023-09-29 RX ADMIN — ENOXAPARIN SODIUM 40 MILLIGRAM(S): 100 INJECTION SUBCUTANEOUS at 06:12

## 2023-09-29 RX ADMIN — Medication 100 MILLIGRAM(S): at 00:19

## 2023-09-29 RX ADMIN — NYSTATIN CREAM 1 APPLICATION(S): 100000 CREAM TOPICAL at 13:10

## 2023-09-29 RX ADMIN — Medication 650 MILLIGRAM(S): at 06:11

## 2023-09-29 RX ADMIN — Medication 650 MILLIGRAM(S): at 00:17

## 2023-09-29 RX ADMIN — AMLODIPINE BESYLATE 10 MILLIGRAM(S): 2.5 TABLET ORAL at 06:17

## 2023-09-29 RX ADMIN — Medication 650 MILLIGRAM(S): at 21:26

## 2023-09-29 RX ADMIN — Medication 100 MILLIGRAM(S): at 06:13

## 2023-09-29 RX ADMIN — NYSTATIN CREAM 1 APPLICATION(S): 100000 CREAM TOPICAL at 06:14

## 2023-09-29 NOTE — CONSULT NOTE ADULT - PROBLEM SELECTOR RECOMMENDATION 9
-CT findings provide no definitive evidence for malignancy  - Will await MRI for better imaging of the liver  -Calcifications in liver and spleen likely secondary to granulomatous disease.  -Request pt's previous colonoscopy results.

## 2023-09-29 NOTE — CONSULT NOTE ADULT - SUBJECTIVE AND OBJECTIVE BOX
Chief Complaint:  Patient is a 83y old  Female who presents with a chief complaint of Severe Sepsis 2/2 AMS/UTI, Acute CHF Exacerbation (28 Sep 2023 19:32)        Patient is a 83y old  Female who presents with a chief complaint of Severe Sepsis 2/2 AMS/UTI, Acute CHF Exacerbation (28 Sep 2023 19:32)      HPI:  83F with PMHX of DM2, HTN, HLD, b/l leg swelling (on lasix), presenting to ED with  and daughter c/o multiple medical complaints including SOB/LEWIS x1 week as well as AMS, Fever/Chills described as "entire body shaking", and dysuria, nausea, and headache. Patient have taken Tylenol without any improvement. Denies chest pain, palpitation, vomiting, diarrhea. In ED patient noted to be confused, tachycardic, febrile with Tmax 103.6F rectal. Labs significant for leukocytosis. Patient triggered sepsis criteria and was treated with empiric IV aBX and goal directed IVF therapy. Subsequently became hypoxic with mild respiratory distress which was treated with O2 via NC and Lasix. CXR with significant BL pulmonary infiltrates bilaterally likely CHF. UA positive suspicious for UTI.     Unable to obtain ROS 2/2 AMS however remainder of ROS negative as per daughter providing HPI/ROS at bedside.  (26 Sep 2023 02:36)      PAST MEDICAL & SURGICAL HISTORY:  DM2 (diabetes mellitus, type 2)      HTN (hypertension)      HLD (hyperlipidemia)      No significant past surgical history          REVIEW OF SYSTEMS:   General: Negative  HEENT: Negative  CV: Negative  Respiratory: Negative  GI: See HPI  : Negative  MSK: Negative  Hematologic: Negative  Skin: Negative    MEDICATIONS:   MEDICATIONS  (STANDING):  amLODIPine   Tablet 10 milliGRAM(s) Oral daily  cefTRIAXone Injectable. 2000 milliGRAM(s) IV Push every 24 hours  dextrose 5%. 1000 milliLiter(s) (50 mL/Hr) IV Continuous <Continuous>  dextrose 5%. 1000 milliLiter(s) (100 mL/Hr) IV Continuous <Continuous>  dextrose 50% Injectable 12.5 Gram(s) IV Push once  dextrose 50% Injectable 25 Gram(s) IV Push once  dextrose 50% Injectable 25 Gram(s) IV Push once  enoxaparin Injectable 40 milliGRAM(s) SubCutaneous every 24 hours  glucagon  Injectable 1 milliGRAM(s) IntraMuscular once  influenza  Vaccine (HIGH DOSE) 0.7 milliLiter(s) IntraMuscular once  insulin lispro (ADMELOG) corrective regimen sliding scale   SubCutaneous Before meals and at bedtime  losartan 100 milliGRAM(s) Oral daily  nystatin Powder 1 Application(s) Topical three times a day    MEDICATIONS  (PRN):  acetaminophen     Tablet .. 650 milliGRAM(s) Oral every 6 hours PRN Temp greater or equal to 38C (100.4F), Mild Pain (1 - 3)  aluminum hydroxide/magnesium hydroxide/simethicone Suspension 30 milliLiter(s) Oral every 4 hours PRN Dyspepsia  dextrose Oral Gel 15 Gram(s) Oral once PRN Blood Glucose LESS THAN 70 milliGRAM(s)/deciliter  guaiFENesin Oral Liquid (Sugar-Free) 100 milliGRAM(s) Oral every 6 hours PRN Cough  melatonin 3 milliGRAM(s) Oral at bedtime PRN Insomnia  ondansetron Injectable 4 milliGRAM(s) IV Push every 8 hours PRN Nausea and/or Vomiting          DIET:  Diet, DASH/TLC:   Sodium & Cholesterol Restricted  Consistent Carbohydrate Evening Snack (CSTCHOSN)  1200mL Fluid Restriction ([id number]) (09-26-23 @ 04:36) [Active]          ALLERGIES:   Allergies    No Known Allergies    Intolerances        Substance Use:   (  ) never used  (  ) other:  Tobacco Usage:  (   ) never smoked   (   ) former smoker   (   ) current smoker  (     ) pack year  (        ) last cigarette date  Alcohol Usage:    Family History   IBD (  ) Yes   (  ) No  GI Malignancy (  )  Yes    (  ) No    Health Management  Last Colonoscopy:  Last Endoscopy:     VITAL SIGNS:   Vital Signs Last 24 Hrs  T(C): 36.8 (29 Sep 2023 09:03), Max: 37.1 (28 Sep 2023 21:58)  T(F): 98.2 (29 Sep 2023 09:03), Max: 98.8 (28 Sep 2023 21:58)  HR: 68 (29 Sep 2023 09:03) (61 - 73)  BP: 128/82 (29 Sep 2023 09:03) (108/56 - 148/72)  BP(mean): --  RR: 18 (29 Sep 2023 09:03) (18 - 18)  SpO2: 98% (29 Sep 2023 09:03) (93% - 99%)    Parameters below as of 29 Sep 2023 09:03  Patient On (Oxygen Delivery Method): room air      I&O's Summary      PHYSICAL EXAM:   GENERAL:  No acute distress  HEENT:  NC/AT, conjunctiva clear, sclera anicteric  CHEST:  No increased effort  HEART:  Regular rate  ABDOMEN:  Soft, non-tender, non-distended, normoactive bowel sounds, no rebound or guarding  EXTREMITIES: No edema  SKIN:  Warm, dry  NEURO:  Calm, cooperative    LABS:                        10.6   5.70  )-----------( 230      ( 29 Sep 2023 05:23 )             32.2     Hemoglobin: 10.6 g/dL (09-29-23 @ 05:23)  Hemoglobin: 10.4 g/dL (09-28-23 @ 03:21)  Hemoglobin: 10.2 g/dL (09-27-23 @ 04:33)  Hemoglobin: 10.9 g/dL (09-26-23 @ 09:54)    09-29    134<L>  |  98  |  12.7  ----------------------------<  136<H>  4.8   |  26.0  |  0.62    Ca    9.7      29 Sep 2023 05:23  Mg     2.1     09-29              Culture - Blood (collected 26 Sep 2023 13:02)  Source: .Blood Blood  Preliminary Report (28 Sep 2023 22:02):    No growth at 48 Hours    Culture - Blood (collected 26 Sep 2023 10:58)  Source: .Blood Blood  Preliminary Report (28 Sep 2023 22:02):    No growth at 48 Hours                                        RADIOLOGY & ADDITIONAL STUDIES:      ACC: [id number] EXAM:  CT ABDOMEN AND PELVIS IC   ORDERED BY: ANJALI TIDWELL     PROCEDURE DATE:  09/27/2023          INTERPRETATION:  CLINICAL INFORMATION: UTI, bacteremia    COMPARISON: No prior imaging for comparison.    CONTRAST/COMPLICATIONS:  IV Contrast: Omnipaque 350  95 cc administered   5 cc discarded  Oral Contrast: NONE  Complications: None reported at time of study completion    PROCEDURE:  CT of the Abdomen and Pelvis was performed.  Sagittal and coronal reformats were performed.    FINDINGS:  LOWER CHEST: 5 mm left lower lobe pulmonary nodule, image 3:4. Subpleural   dependent atelectatic changes.    LIVER: Multiple punctate calcifications likely from metastatic disease.   Hypodense cystic lesion in subcapsular segment IVb measuring 1.4 x 0.8 cm   with peripheral calcification and additional subcentimeter hypodensities   in the right lobe are not fully characterized.  BILE DUCTS: Normal caliber.  GALLBLADDER: Within normal limits.  SPLEEN: Calcific foci likely from granulomatous disease. Normal-sized   spleen.  PANCREAS: Mild fatty involution in the uncinate process. Otherwise normal   appearance, no ductal dilatation or mass.  ADRENALS: Within normal limits.  KIDNEYS/URETERS: 2.1 cm right interpolar renal cyst. There is a  wedge-shaped hypodense focus of decreased cortical enhancement in the   left kidney lower pole with overlying cortical retraction and somewhat   adjacent perirenal fat stranding. Otherwise bilateral symmetric   homogeneous enhancement of the renal parenchyma. No hydronephrosis or   hydroureter. Mild periureteral fat stranding. Increased urothelial   enhancement of the renal pelvis bilaterally left more than right is   likely due to inflammation/infection.    BLADDER: Urinary bladder is well-distended without wall thickening or   mass.  REPRODUCTIVE ORGANS: Uterus with calcified leiomyoma, otherwise within   normal limits. Adnexa within normal limits.    BOWEL: Stomach, duodenum and small bowel normal in appearance. Sigmoid   and descending colonic diverticulosis is noted, no evidence of acute   diverticulitis. Mild fat stranding along the left prerenal fascia is   unrelated to the colon and likely related to UTI. No bowel obstruction.   Appendix is normal.  PERITONEUM: No ascites.  VESSELS: Within normal limits.  RETROPERITONEUM/LYMPH NODES: No lymphadenopathy.  ABDOMINAL WALL: Fat-containing periumbilical hernia without signs of   inflammation, no bowel involvement. Air locules in the anterior abdominal   wall subcutaneous tissue are likely from subcutaneous injections.  BONES: Dextroscoliosis with discogenic degenerative disease of the spine.    IMPRESSION:  Bilateral urothelial enhancement of the renal pelvis and ureters without   obstructive uropathy is compatible with upperurinary tract infection.    Wedge-shaped hypodense focus in the left renal cortex is favored to   represent a renal cortical scar from prior inflammatory/infectious insult   or infarct rather than acute pyelonephritis.    Non complicated sigmoid diverticulosis. Normal appendix.    Other non emergent findings as above.        --- End of Report ---            KEENAN MIRANDA MD; Attending Radiologist  This document has been electronically signed. Sep 27 2023  9:54PM  09-27-23 @ 18:04           Chief Complaint:  Patient is a 83y old  Female who presents with a chief complaint of Severe Sepsis 2/2 AMS/UTI, Acute CHF Exacerbation (28 Sep 2023 19:32)        HPI:  83F with PMHX of DM2, HTN, HLD, b/l leg swelling (on lasix) seen at bedside in the AM with  Anjelica Peterson. Reports loss of appetite, 20 pound weight loss in the past month, and nausea. Patient reports no history of hepatitis, cancer, alcohol use, or smoking. No notable family history of GI conditions or malignancy. Reports last colonoscopy in her 70s with no notable pathology.     Denies fevers, abdominal pain, hematochezia, hematuria, change in bowel movements, and vomiting.       PAST MEDICAL & SURGICAL HISTORY:  DM2 (diabetes mellitus, type 2)  HTN (hypertension)  HLD (hyperlipidemia)  No significant past surgical history        REVIEW OF SYSTEMS:   General: (+) loss of appetite, weight loss  HEENT: Negative  CV: Negative  Respiratory: Negative  GI: (+) nausea  : Negative  MSK: Negative  Hematologic: Negative  Skin: Negative    MEDICATIONS:   MEDICATIONS  (STANDING):  amLODIPine   Tablet 10 milliGRAM(s) Oral daily  cefTRIAXone Injectable. 2000 milliGRAM(s) IV Push every 24 hours  dextrose 5%. 1000 milliLiter(s) (50 mL/Hr) IV Continuous <Continuous>  dextrose 5%. 1000 milliLiter(s) (100 mL/Hr) IV Continuous <Continuous>  dextrose 50% Injectable 12.5 Gram(s) IV Push once  dextrose 50% Injectable 25 Gram(s) IV Push once  dextrose 50% Injectable 25 Gram(s) IV Push once  enoxaparin Injectable 40 milliGRAM(s) SubCutaneous every 24 hours  glucagon  Injectable 1 milliGRAM(s) IntraMuscular once  influenza  Vaccine (HIGH DOSE) 0.7 milliLiter(s) IntraMuscular once  insulin lispro (ADMELOG) corrective regimen sliding scale   SubCutaneous Before meals and at bedtime  losartan 100 milliGRAM(s) Oral daily  nystatin Powder 1 Application(s) Topical three times a day    MEDICATIONS  (PRN):  acetaminophen     Tablet .. 650 milliGRAM(s) Oral every 6 hours PRN Temp greater or equal to 38C (100.4F), Mild Pain (1 - 3)  aluminum hydroxide/magnesium hydroxide/simethicone Suspension 30 milliLiter(s) Oral every 4 hours PRN Dyspepsia  dextrose Oral Gel 15 Gram(s) Oral once PRN Blood Glucose LESS THAN 70 milliGRAM(s)/deciliter  guaiFENesin Oral Liquid (Sugar-Free) 100 milliGRAM(s) Oral every 6 hours PRN Cough  melatonin 3 milliGRAM(s) Oral at bedtime PRN Insomnia  ondansetron Injectable 4 milliGRAM(s) IV Push every 8 hours PRN Nausea and/or Vomiting      DIET:  Diet, DASH/TLC:   Sodium & Cholesterol Restricted  Consistent Carbohydrate Evening Snack (CSTCHOSN)  1200mL Fluid Restriction ([id number]) (09-26-23 @ 04:36) [Active]      Colonoscopy: as per patient, within the past 10 years- no notable pathology  Endoscopy: never    Family History:  As per patient no GI history  No malignancy      Smoking: Never  Alcohol: Never    ALLERGIES:   Allergies    No Known Allergies    Intolerances    VITAL SIGNS:   Vital Signs Last 24 Hrs  T(C): 36.8 (29 Sep 2023 09:03), Max: 37.1 (28 Sep 2023 21:58)  T(F): 98.2 (29 Sep 2023 09:03), Max: 98.8 (28 Sep 2023 21:58)  HR: 68 (29 Sep 2023 09:03) (61 - 73)  BP: 128/82 (29 Sep 2023 09:03) (108/56 - 148/72)  BP(mean): --  RR: 18 (29 Sep 2023 09:03) (18 - 18)  SpO2: 98% (29 Sep 2023 09:03) (93% - 99%)    Parameters below as of 29 Sep 2023 09:03  Patient On (Oxygen Delivery Method): room air      I&O's Summary      PHYSICAL EXAM:   GENERAL:  No acute distress  HEENT:  NC/AT, conjunctiva clear, sclera anicteric  CHEST:  No increased effort  HEART:  Regular rate, holosystolic murmur  ABDOMEN:  Soft, non-tender, non-distended, normoactive bowel sounds, no rebound or guarding, no organomegaly  EXTREMITIES: No edema  SKIN:  Warm, dry  NEURO:  Calm, cooperative    LABS:                        10.6   5.70  )-----------( 230      ( 29 Sep 2023 05:23 )             32.2     Hemoglobin: 10.6 g/dL (09-29-23 @ 05:23)  Hemoglobin: 10.4 g/dL (09-28-23 @ 03:21)  Hemoglobin: 10.2 g/dL (09-27-23 @ 04:33)  Hemoglobin: 10.9 g/dL (09-26-23 @ 09:54)    09-29    134<L>  |  98  |  12.7  ----------------------------<  136<H>  4.8   |  26.0  |  0.62    Ca    9.7      29 Sep 2023 05:23  Mg     2.1     09-29              Culture - Blood (collected 26 Sep 2023 13:02)  Source: .Blood Blood  Preliminary Report (28 Sep 2023 22:02):    No growth at 48 Hours    Culture - Blood (collected 26 Sep 2023 10:58)  Source: .Blood Blood  Preliminary Report (28 Sep 2023 22:02):    No growth at 48 Hours                                        RADIOLOGY & ADDITIONAL STUDIES:      ACC: [id number] EXAM:  CT ABDOMEN AND PELVIS IC   ORDERED BY: ANJALI TIDWELL     PROCEDURE DATE:  09/27/2023          INTERPRETATION:  CLINICAL INFORMATION: UTI, bacteremia    COMPARISON: No prior imaging for comparison.    CONTRAST/COMPLICATIONS:  IV Contrast: Omnipaque 350  95 cc administered   5 cc discarded  Oral Contrast: NONE  Complications: None reported at time of study completion    PROCEDURE:  CT of the Abdomen and Pelvis was performed.  Sagittal and coronal reformats were performed.    FINDINGS:  LOWER CHEST: 5 mm left lower lobe pulmonary nodule, image 3:4. Subpleural   dependent atelectatic changes.    LIVER: Multiple punctate calcifications likely from metastatic disease.   Hypodense cystic lesion in subcapsular segment IVb measuring 1.4 x 0.8 cm   with peripheral calcification and additional subcentimeter hypodensities   in the right lobe are not fully characterized.  BILE DUCTS: Normal caliber.  GALLBLADDER: Within normal limits.  SPLEEN: Calcific foci likely from granulomatous disease. Normal-sized   spleen.  PANCREAS: Mild fatty involution in the uncinate process. Otherwise normal   appearance, no ductal dilatation or mass.  ADRENALS: Within normal limits.  KIDNEYS/URETERS: 2.1 cm right interpolar renal cyst. There is a  wedge-shaped hypodense focus of decreased cortical enhancement in the   left kidney lower pole with overlying cortical retraction and somewhat   adjacent perirenal fat stranding. Otherwise bilateral symmetric   homogeneous enhancement of the renal parenchyma. No hydronephrosis or   hydroureter. Mild periureteral fat stranding. Increased urothelial   enhancement of the renal pelvis bilaterally left more than right is   likely due to inflammation/infection.    BLADDER: Urinary bladder is well-distended without wall thickening or   mass.  REPRODUCTIVE ORGANS: Uterus with calcified leiomyoma, otherwise within   normal limits. Adnexa within normal limits.    BOWEL: Stomach, duodenum and small bowel normal in appearance. Sigmoid   and descending colonic diverticulosis is noted, no evidence of acute   diverticulitis. Mild fat stranding along the left prerenal fascia is   unrelated to the colon and likely related to UTI. No bowel obstruction.   Appendix is normal.  PERITONEUM: No ascites.  VESSELS: Within normal limits.  RETROPERITONEUM/LYMPH NODES: No lymphadenopathy.  ABDOMINAL WALL: Fat-containing periumbilical hernia without signs of   inflammation, no bowel involvement. Air locules in the anterior abdominal   wall subcutaneous tissue are likely from subcutaneous injections.  BONES: Dextroscoliosis with discogenic degenerative disease of the spine.    IMPRESSION:  Bilateral urothelial enhancement of the renal pelvis and ureters without   obstructive uropathy is compatible with upperurinary tract infection.    Wedge-shaped hypodense focus in the left renal cortex is favored to   represent a renal cortical scar from prior inflammatory/infectious insult   or infarct rather than acute pyelonephritis.    Non complicated sigmoid diverticulosis. Normal appendix.    Other non emergent findings as above.        --- End of Report ---            KEENAN MIRANDA MD; Attending Radiologist  This document has been electronically signed. Sep 27 2023  9:54PM  09-27-23 @ 18:04

## 2023-09-29 NOTE — CONSULT NOTE ADULT - ASSESSMENT
ASSESSMENT:  83F with PMHX of DM2, HTN, HLD, b/l leg swelling (on lasix), presenting to ED with  and daughter c/o multiple medical complaints including SOB/LEWIS x1 week as well as AMS, Fever/Chills described as "entire body shaking", and dysuria, nausea, and headache admitted for Severe Sepsis 2/2 AMS 2/2 Acute Complicated UTI, Acute Hypoxic Respiratory Failure 2/2 CHF Exacerbation vs Iatrogenic Volume Overload, Hyponatremia, Fungal Dermatitis.

## 2023-09-29 NOTE — CONSULT NOTE ADULT - ATTENDING COMMENTS
I agree with assessment and plan as above. Pt with incidental finding of hepatic calcifications of unclear significance. Normal liver tests. Follow up with MRI abd liver protocol.

## 2023-09-30 LAB
ALBUMIN SERPL ELPH-MCNC: 3 G/DL — LOW (ref 3.3–5.2)
ALP SERPL-CCNC: 61 U/L — SIGNIFICANT CHANGE UP (ref 40–120)
ALT FLD-CCNC: 32 U/L — SIGNIFICANT CHANGE UP
ANION GAP SERPL CALC-SCNC: 11 MMOL/L — SIGNIFICANT CHANGE UP (ref 5–17)
ANION GAP SERPL CALC-SCNC: 12 MMOL/L — SIGNIFICANT CHANGE UP (ref 5–17)
APTT BLD: 30 SEC — SIGNIFICANT CHANGE UP (ref 24.5–35.6)
AST SERPL-CCNC: 33 U/L — HIGH
BILIRUB SERPL-MCNC: 0.2 MG/DL — LOW (ref 0.4–2)
BUN SERPL-MCNC: 10.6 MG/DL — SIGNIFICANT CHANGE UP (ref 8–20)
BUN SERPL-MCNC: 10.6 MG/DL — SIGNIFICANT CHANGE UP (ref 8–20)
CALCIUM SERPL-MCNC: 10 MG/DL — SIGNIFICANT CHANGE UP (ref 8.4–10.5)
CALCIUM SERPL-MCNC: 9.2 MG/DL — SIGNIFICANT CHANGE UP (ref 8.4–10.5)
CHLORIDE SERPL-SCNC: 98 MMOL/L — SIGNIFICANT CHANGE UP (ref 96–108)
CHLORIDE SERPL-SCNC: 99 MMOL/L — SIGNIFICANT CHANGE UP (ref 96–108)
CO2 SERPL-SCNC: 25 MMOL/L — SIGNIFICANT CHANGE UP (ref 22–29)
CO2 SERPL-SCNC: 26 MMOL/L — SIGNIFICANT CHANGE UP (ref 22–29)
CREAT SERPL-MCNC: 0.57 MG/DL — SIGNIFICANT CHANGE UP (ref 0.5–1.3)
CREAT SERPL-MCNC: 0.72 MG/DL — SIGNIFICANT CHANGE UP (ref 0.5–1.3)
EGFR: 84 ML/MIN/1.73M2 — SIGNIFICANT CHANGE UP
EGFR: 91 ML/MIN/1.73M2 — SIGNIFICANT CHANGE UP
GLUCOSE BLDC GLUCOMTR-MCNC: 133 MG/DL — HIGH (ref 70–99)
GLUCOSE BLDC GLUCOMTR-MCNC: 142 MG/DL — HIGH (ref 70–99)
GLUCOSE BLDC GLUCOMTR-MCNC: 175 MG/DL — HIGH (ref 70–99)
GLUCOSE BLDC GLUCOMTR-MCNC: 195 MG/DL — HIGH (ref 70–99)
GLUCOSE SERPL-MCNC: 128 MG/DL — HIGH (ref 70–99)
GLUCOSE SERPL-MCNC: 138 MG/DL — HIGH (ref 70–99)
HCT VFR BLD CALC: 31.7 % — LOW (ref 34.5–45)
HCT VFR BLD CALC: 35.7 % — SIGNIFICANT CHANGE UP (ref 34.5–45)
HGB BLD-MCNC: 10.7 G/DL — LOW (ref 11.5–15.5)
HGB BLD-MCNC: 11.7 G/DL — SIGNIFICANT CHANGE UP (ref 11.5–15.5)
INR BLD: 1.08 RATIO — SIGNIFICANT CHANGE UP (ref 0.85–1.18)
MAGNESIUM SERPL-MCNC: 2.2 MG/DL — SIGNIFICANT CHANGE UP (ref 1.6–2.6)
MCHC RBC-ENTMCNC: 28.1 PG — SIGNIFICANT CHANGE UP (ref 27–34)
MCHC RBC-ENTMCNC: 28.3 PG — SIGNIFICANT CHANGE UP (ref 27–34)
MCHC RBC-ENTMCNC: 32.8 GM/DL — SIGNIFICANT CHANGE UP (ref 32–36)
MCHC RBC-ENTMCNC: 33.8 GM/DL — SIGNIFICANT CHANGE UP (ref 32–36)
MCV RBC AUTO: 83.9 FL — SIGNIFICANT CHANGE UP (ref 80–100)
MCV RBC AUTO: 85.6 FL — SIGNIFICANT CHANGE UP (ref 80–100)
PHOSPHATE SERPL-MCNC: 3.1 MG/DL — SIGNIFICANT CHANGE UP (ref 2.4–4.7)
PLATELET # BLD AUTO: 267 K/UL — SIGNIFICANT CHANGE UP (ref 150–400)
PLATELET # BLD AUTO: 301 K/UL — SIGNIFICANT CHANGE UP (ref 150–400)
POTASSIUM SERPL-MCNC: 4.3 MMOL/L — SIGNIFICANT CHANGE UP (ref 3.5–5.3)
POTASSIUM SERPL-MCNC: 5.1 MMOL/L — SIGNIFICANT CHANGE UP (ref 3.5–5.3)
POTASSIUM SERPL-SCNC: 4.3 MMOL/L — SIGNIFICANT CHANGE UP (ref 3.5–5.3)
POTASSIUM SERPL-SCNC: 5.1 MMOL/L — SIGNIFICANT CHANGE UP (ref 3.5–5.3)
PROCALCITONIN SERPL-MCNC: 0.77 NG/ML — HIGH (ref 0.02–0.1)
PROT SERPL-MCNC: 6.2 G/DL — LOW (ref 6.6–8.7)
PROTHROM AB SERPL-ACNC: 12 SEC — SIGNIFICANT CHANGE UP (ref 9.5–13)
RBC # BLD: 3.78 M/UL — LOW (ref 3.8–5.2)
RBC # BLD: 4.17 M/UL — SIGNIFICANT CHANGE UP (ref 3.8–5.2)
RBC # FLD: 13.7 % — SIGNIFICANT CHANGE UP (ref 10.3–14.5)
RBC # FLD: 13.7 % — SIGNIFICANT CHANGE UP (ref 10.3–14.5)
SODIUM SERPL-SCNC: 134 MMOL/L — LOW (ref 135–145)
SODIUM SERPL-SCNC: 137 MMOL/L — SIGNIFICANT CHANGE UP (ref 135–145)
WBC # BLD: 6.29 K/UL — SIGNIFICANT CHANGE UP (ref 3.8–10.5)
WBC # BLD: 7.04 K/UL — SIGNIFICANT CHANGE UP (ref 3.8–10.5)
WBC # FLD AUTO: 6.29 K/UL — SIGNIFICANT CHANGE UP (ref 3.8–10.5)
WBC # FLD AUTO: 7.04 K/UL — SIGNIFICANT CHANGE UP (ref 3.8–10.5)

## 2023-09-30 PROCEDURE — 73060 X-RAY EXAM OF HUMERUS: CPT | Mod: 26,RT

## 2023-09-30 PROCEDURE — 99232 SBSQ HOSP IP/OBS MODERATE 35: CPT

## 2023-09-30 PROCEDURE — 73502 X-RAY EXAM HIP UNI 2-3 VIEWS: CPT | Mod: 26,RT

## 2023-09-30 RX ORDER — KETOROLAC TROMETHAMINE 30 MG/ML
15 SYRINGE (ML) INJECTION ONCE
Refills: 0 | Status: DISCONTINUED | OUTPATIENT
Start: 2023-09-30 | End: 2023-09-30

## 2023-09-30 RX ADMIN — Medication 15 MILLIGRAM(S): at 06:35

## 2023-09-30 RX ADMIN — NYSTATIN CREAM 1 APPLICATION(S): 100000 CREAM TOPICAL at 13:51

## 2023-09-30 RX ADMIN — Medication 1: at 12:35

## 2023-09-30 RX ADMIN — CEFTRIAXONE 2000 MILLIGRAM(S): 500 INJECTION, POWDER, FOR SOLUTION INTRAMUSCULAR; INTRAVENOUS at 11:32

## 2023-09-30 RX ADMIN — Medication 100 MILLIGRAM(S): at 05:13

## 2023-09-30 RX ADMIN — Medication 650 MILLIGRAM(S): at 22:18

## 2023-09-30 RX ADMIN — Medication 1: at 21:20

## 2023-09-30 RX ADMIN — NYSTATIN CREAM 1 APPLICATION(S): 100000 CREAM TOPICAL at 05:18

## 2023-09-30 RX ADMIN — Medication 100 MILLIGRAM(S): at 21:19

## 2023-09-30 RX ADMIN — Medication 650 MILLIGRAM(S): at 21:18

## 2023-09-30 RX ADMIN — NYSTATIN CREAM 1 APPLICATION(S): 100000 CREAM TOPICAL at 21:19

## 2023-09-30 RX ADMIN — ENOXAPARIN SODIUM 40 MILLIGRAM(S): 100 INJECTION SUBCUTANEOUS at 05:17

## 2023-09-30 RX ADMIN — LOSARTAN POTASSIUM 100 MILLIGRAM(S): 100 TABLET, FILM COATED ORAL at 05:17

## 2023-09-30 RX ADMIN — Medication 100 MILLIGRAM(S): at 13:56

## 2023-09-30 RX ADMIN — AMLODIPINE BESYLATE 10 MILLIGRAM(S): 2.5 TABLET ORAL at 05:17

## 2023-09-30 RX ADMIN — Medication 15 MILLIGRAM(S): at 06:25

## 2023-09-30 NOTE — CHART NOTE - NSCHARTNOTEFT_GEN_A_CORE
LATE ENTRY  RN called patient c/o of B/L LE pain.   83F with PMHX of DM2, HTN, HLD, b/l leg swelling (on lasix), presenting to ED with  and daughter c/o multiple medical complaints including SOB/LEWIS x1 week as well as AMS, Fever/Chills described as "entire body shaking", and dysuria, nausea, and headache. At time of evaluation, pt only c/o of RT hip pain w/ radiation to the RT LE x 6 months. Pt states the pain is 5/10, unable to describe the pain (electrical, dull, stabbing). RT hip-RT leg pain is worse with movement, especially when she is on her RT side. She has been taking tylenol with minimal relief. Pt denies using walking assistant devices but noticed the pain being more progressive the past 2 weeks. Pt denies trauma, recent falls. Pt denies CP, palpitations, SOB, dyspnea, N/V/D, abdominal pain, weakness, limited ROM of LE, or other complaints at her time. Irish translation done by Ginger PATEL.     T(C): 36.7   HR: 77   BP: 136/75  RR: 18   SpO2: 94%     CONSTITUTIONAL: Well groomed, no apparent distress  EYES: PERRLA and symmetric, EOMI, No conjunctival or scleral injection, non-icteric  ENMT: Oral mucosa with moist membranes. Normal dentition; no pharyngeal injection or exudates  RESP: No respiratory distress, no use of accessory muscles; CTA b/l, no WRR  CV: RRR, +S1S2, no MRG; no JVD  GI: Soft, NT, ND, no rebound, no guarding  MSK: gait stable when ambulating patient to chair, B/L LE strength 5/5, RT hip pain w/ internal and external rotation   SKIN: No rashes or ulcers noted  NEURO: CN II-XII intact; normal reflexes in upper and lower extremities, sensation intact in upper and lower extremities b/l to light touch  PSYCH: mood and affect appropriate    Assessment:   89F with PMHX of DM2, HTN, HLD, b/l leg swelling (on lasix) presents for SOB/LEWIS x1 week, AMS, fever/chills being seen for RT LE pain.     Plan:   RT hip pain   -Noted to have pain w/ internal and external rotation of the RT hip, most likely radiculopathy 2/2 impinged nerve   -Start with RT hip x-ray  -Labs/ electrolytes WNL   -minimal relief w/ tylenol  -toradol 15 mg IVP x1 given   -PT ordered     Radiologist discussed w/ RN results of CT  A/P   IMPRESSION:  Hepatic lesions are consistent with hepatic cysts. No solid lesions to   suggest neoplasm or metastases.    Asymmetric inflammation of the left renal parenchyma without urinary   obstruction is suggestive of left upper urinary tract infection.    Left lower pole complex hemorrhagic/proteinaceous renal cyst and simple   right renal cyst. LATE ENTRY  RN called patient c/o of B/L LE pain.   83F with PMHX of DM2, HTN, HLD, b/l leg swelling (on lasix), presenting to ED with  and daughter c/o multiple medical complaints including SOB/LEWIS x1 week as well as AMS, Fever/Chills described as "entire body shaking", and dysuria, nausea, and headache. At time of evaluation, pt only c/o of RT hip pain w/ radiation to the RT LE x 6 months. Pt states the pain is 5/10, unable to describe the pain (electrical, dull, stabbing). RT hip-RT leg pain is worse with movement, especially when she is laying on her RT side. She has been taking tylenol with minimal relief. Pt denies using walking assistant devices like a walker or cane but has noticed the pain being more progressive the past 2 weeks. Pt denies trauma or recent falls. Pt denies CP, palpitations, SOB, dyspnea, N/V/D, abdominal pain, weakness, limited ROM of LE, or other complaints at her time. Ethiopian translation done by Ginger PATEL.     T(C): 36.7   HR: 77   BP: 136/75  RR: 18   SpO2: 94%     CONSTITUTIONAL: Well groomed, no apparent distress  EYES: PERRLA and symmetric, EOMI, No conjunctival or scleral injection, non-icteric  ENMT: Oral mucosa with moist membranes. Normal dentition; no pharyngeal injection or exudates  RESP: No respiratory distress, no use of accessory muscles; CTA b/l, no WRR  CV: RRR, +S1S2, no MRG; no JVD  GI: Soft, NT, ND, no rebound, no guarding  MSK: gait stable when ambulating patient to chair, B/L LE strength 5/5, RT hip pain w/ internal and external rotation   SKIN: No rashes or ulcers noted  NEURO: CN II-XII intact; normal reflexes in upper and lower extremities, sensation intact in upper and lower extremities b/l to light touch  PSYCH: mood and affect appropriate    Assessment:   89F with PMHX of DM2, HTN, HLD, b/l leg swelling (on lasix) presents for SOB/LEWIS x1 week, AMS, fever/chills being seen for RT LE pain.     Plan:   RT hip pain   -Noted to have pain w/ internal and external rotation of the RT hip, most likely radiculopathy 2/2 impinged nerve  -Pain presented in L2-L3 dermatome based on the patient's presentation of where the pain originated and radiated to   -Start with RT hip x-ray  -May need MRI to see further if nerve impingment   -Labs/ electrolytes WNL   -minimal relief w/ tylenol  -toradol 15 mg IVP x1 given   -PT ordered     Addendum:   Radiologist discussed w/ RN results of CT  A/P   IMPRESSION:  Hepatic lesions are consistent with hepatic cysts. No solid lesions to   suggest neoplasm or metastases.    Asymmetric inflammation of the left renal parenchyma without urinary   obstruction is suggestive of left upper urinary tract infection.    Left lower pole complex hemorrhagic/proteinaceous renal cyst and simple   right renal cyst.

## 2023-09-30 NOTE — PROGRESS NOTE ADULT - ASSESSMENT
83F with PMHX of DM2, HTN, HLD, b/l leg swelling (on lasix), presenting to ED with  and daughter c/o multiple medical complaints including SOB/LEWIS x1 week as well as AMS, Fever/Chills described as "entire body shaking", and dysuria, nausea, and headache.    Sepsis from E.coli (present on admission, now resolved )  Initial source likely UTI with secondary bacteremia   Repeat bld cx neg thus far  Continue Rocephin 2 gm IVP daily   CT Abd / Pelvis noted     Metabolic Encephalopathy   Likely due to above   CT head (9/26/23): negative for any acute findings  Resolved     Acute Hypoxic respiratory Failure   Likely due to volume overload from fluid resuscitation for Sepsis  ECHO with normal EF. No Acute Heart Failure.   s/p Lasix   Weaned off supplemental oxygen     Hyponatremia  Improving     DM 2  A1c 7.4  Accu checks and ISS    HTN  Continue Losartan and Amlodipine     Hypokalemia / Hypophosphatemia   Repleted    Liver Lesions  No history of malignancy   MRI Abdomen noted. d/w GI. Lesions cysts of undetermined significance, likely benign. Recommend f/u at appropriate interval to ensure stability       DVT Prophylaxis -- Lovenox SQ    Dispo: Home once stable.    Updated patient's  and daughter at bedside.

## 2023-10-01 LAB
ANION GAP SERPL CALC-SCNC: 12 MMOL/L — SIGNIFICANT CHANGE UP (ref 5–17)
BUN SERPL-MCNC: 12.7 MG/DL — SIGNIFICANT CHANGE UP (ref 8–20)
CALCIUM SERPL-MCNC: 9.7 MG/DL — SIGNIFICANT CHANGE UP (ref 8.4–10.5)
CHLORIDE SERPL-SCNC: 99 MMOL/L — SIGNIFICANT CHANGE UP (ref 96–108)
CO2 SERPL-SCNC: 24 MMOL/L — SIGNIFICANT CHANGE UP (ref 22–29)
CREAT SERPL-MCNC: 0.67 MG/DL — SIGNIFICANT CHANGE UP (ref 0.5–1.3)
CULTURE RESULTS: SIGNIFICANT CHANGE UP
CULTURE RESULTS: SIGNIFICANT CHANGE UP
EGFR: 88 ML/MIN/1.73M2 — SIGNIFICANT CHANGE UP
GLUCOSE BLDC GLUCOMTR-MCNC: 125 MG/DL — HIGH (ref 70–99)
GLUCOSE BLDC GLUCOMTR-MCNC: 142 MG/DL — HIGH (ref 70–99)
GLUCOSE BLDC GLUCOMTR-MCNC: 156 MG/DL — HIGH (ref 70–99)
GLUCOSE SERPL-MCNC: 128 MG/DL — HIGH (ref 70–99)
HCT VFR BLD CALC: 34.1 % — LOW (ref 34.5–45)
HGB BLD-MCNC: 11.2 G/DL — LOW (ref 11.5–15.5)
MCHC RBC-ENTMCNC: 28.2 PG — SIGNIFICANT CHANGE UP (ref 27–34)
MCHC RBC-ENTMCNC: 32.8 GM/DL — SIGNIFICANT CHANGE UP (ref 32–36)
MCV RBC AUTO: 85.9 FL — SIGNIFICANT CHANGE UP (ref 80–100)
PLATELET # BLD AUTO: 316 K/UL — SIGNIFICANT CHANGE UP (ref 150–400)
POTASSIUM SERPL-MCNC: 4.7 MMOL/L — SIGNIFICANT CHANGE UP (ref 3.5–5.3)
POTASSIUM SERPL-SCNC: 4.7 MMOL/L — SIGNIFICANT CHANGE UP (ref 3.5–5.3)
RBC # BLD: 3.97 M/UL — SIGNIFICANT CHANGE UP (ref 3.8–5.2)
RBC # FLD: 13.7 % — SIGNIFICANT CHANGE UP (ref 10.3–14.5)
SODIUM SERPL-SCNC: 135 MMOL/L — SIGNIFICANT CHANGE UP (ref 135–145)
SPECIMEN SOURCE: SIGNIFICANT CHANGE UP
SPECIMEN SOURCE: SIGNIFICANT CHANGE UP
WBC # BLD: 6.04 K/UL — SIGNIFICANT CHANGE UP (ref 3.8–10.5)
WBC # FLD AUTO: 6.04 K/UL — SIGNIFICANT CHANGE UP (ref 3.8–10.5)

## 2023-10-01 PROCEDURE — 99232 SBSQ HOSP IP/OBS MODERATE 35: CPT

## 2023-10-01 RX ADMIN — NYSTATIN CREAM 1 APPLICATION(S): 100000 CREAM TOPICAL at 13:06

## 2023-10-01 RX ADMIN — Medication 650 MILLIGRAM(S): at 21:45

## 2023-10-01 RX ADMIN — ENOXAPARIN SODIUM 40 MILLIGRAM(S): 100 INJECTION SUBCUTANEOUS at 05:53

## 2023-10-01 RX ADMIN — CEFTRIAXONE 2000 MILLIGRAM(S): 500 INJECTION, POWDER, FOR SOLUTION INTRAMUSCULAR; INTRAVENOUS at 10:09

## 2023-10-01 RX ADMIN — Medication 1: at 23:23

## 2023-10-01 RX ADMIN — NYSTATIN CREAM 1 APPLICATION(S): 100000 CREAM TOPICAL at 21:43

## 2023-10-01 RX ADMIN — AMLODIPINE BESYLATE 10 MILLIGRAM(S): 2.5 TABLET ORAL at 05:52

## 2023-10-01 RX ADMIN — Medication 650 MILLIGRAM(S): at 22:00

## 2023-10-01 RX ADMIN — LOSARTAN POTASSIUM 100 MILLIGRAM(S): 100 TABLET, FILM COATED ORAL at 05:52

## 2023-10-01 RX ADMIN — NYSTATIN CREAM 1 APPLICATION(S): 100000 CREAM TOPICAL at 05:53

## 2023-10-01 RX ADMIN — Medication 100 MILLIGRAM(S): at 05:55

## 2023-10-01 NOTE — PROGRESS NOTE ADULT - REASON FOR ADMISSION
Severe Sepsis 2/2 AMS/UTI, Acute CHF Exacerbation

## 2023-10-01 NOTE — PROGRESS NOTE ADULT - ASSESSMENT
83F with PMHX of DM2, HTN, HLD, b/l leg swelling (on lasix), presenting to ED with  and daughter c/o multiple medical complaints including SOB/LEWIS x1 week as well as AMS, Fever/Chills described as "entire body shaking", and dysuria, nausea, and headache.    Sepsis from E.coli (present on admission, now resolved )  Initial source likely UTI with secondary bacteremia   Repeat bld cx neg thus far  Continue Rocephin 2 gm IVP daily. Sens noted. Will transition to PO on dsc to complete appropriate course  CT Abd / Pelvis noted     Metabolic Encephalopathy   Likely due to above   CT head (9/26/23): negative for any acute findings  Resolved     Acute Hypoxic respiratory Failure   Likely due to volume overload from fluid resuscitation for Sepsis  ECHO with normal EF. No Acute Heart Failure.   s/p Lasix   Weaned off supplemental oxygen     Hyponatremia  Improving     DM 2  A1c 7.4  Accu checks and ISS    HTN  Continue Losartan and Amlodipine     Hypokalemia / Hypophosphatemia   Repleted    Liver Lesions  No history of malignancy   MRI Abdomen noted. d/w GI. Lesions cysts of undetermined significance, likely benign. Recommend f/u at appropriate interval to ensure stability       DVT Prophylaxis -- Lovenox SQ    Dispo: Home once stable.    Updated patient   Pending PT eval, daughter feels she may need FLORENCE

## 2023-10-01 NOTE — PROGRESS NOTE ADULT - SUBJECTIVE AND OBJECTIVE BOX
Metabolic encephalopathy    HPI:  83F with PMHX of DM2, HTN, HLD, b/l leg swelling (on lasix), presenting to ED with  and daughter c/o multiple medical complaints including SOB/LEWIS x1 week as well as AMS, Fever/Chills described as "entire body shaking", and dysuria, nausea, and headache. Patient have taken Tylenol without any improvement. Denies chest pain, palpitation, vomiting, diarrhea. In ED patient noted to be confused, tachycardic, febrile with Tmax 103.6F rectal. Labs significant for leukocytosis. Patient triggered sepsis criteria and was treated with empiric IV aBX and goal directed IVF therapy. Subsequently became hypoxic with mild respiratory distress which was treated with O2 via NC and Lasix. CXR with significant BL pulmonary infiltrates bilaterally likely CHF. UA positive suspicious for UTI.     Unable to obtain ROS 2/2 AMS however remainder of ROS negative as per daughter providing HPI/ROS at bedside.  (26 Sep 2023 02:36)    Interval History:  Patient was seen and examined at bedside around 10 am with  - Carlos.  Has difficulty ambulating due to weakness.   Denies urinary symptoms.   Denies nausea, vomiting or abdominal pain.    ROS:  As per interval history otherwise unremarkable.    PHYSICAL EXAM:  Vital Signs   T(C): 36.7 (28 Sep 2023 15:43), Max: 36.9 (27 Sep 2023 20:08)  T(F): 98 (28 Sep 2023 15:43), Max: 98.4 (27 Sep 2023 20:08)  HR: 70 (28 Sep 2023 15:43) (67 - 73)  BP: 108/56 (28 Sep 2023 15:43) (108/56 - 144/80)  RR: 18 (28 Sep 2023 15:43) (18 - 18)  SpO2: 99% (28 Sep 2023 15:43) (95% - 99%)  Parameters below as of 28 Sep 2023 15:43  Patient On (Oxygen Delivery Method): room air  General: Elderly female sitting in bed comfortably. No acute distress  HEENT: EOMI. Clear conjunctivae. Moist mucus membrane  Neck: Supple.   Chest: Good air entry. No wheezing, rales or rhonchi.   Heart: Normal S1 & S2. RRR.   Abdomen: Obese. Soft. Non-tender. + BS. No CVA tenderness.   Ext: No pedal edema. No calf tenderness   Neuro: Awake and alert. No focal deficit. No speech disorder  Skin: Warm and Dry  Psychiatry: Normal mood and affect    LABS:  CAPILLARY BLOOD GLUCOSE  POCT Blood Glucose.: 192 mg/dL (28 Sep 2023 16:59)  POCT Blood Glucose.: 174 mg/dL (28 Sep 2023 12:23)  POCT Blood Glucose.: 171 mg/dL (28 Sep 2023 09:34)  POCT Blood Glucose.: 136 mg/dL (27 Sep 2023 23:00)                          10.4   6.70  )-----------( 226      ( 28 Sep 2023 03:21 )             30.5     09-28    133<L>  |  96  |  16.6  ----------------------------<  128<H>  4.6   |  25.0  |  0.75    Ca    9.4      28 Sep 2023 03:21  Phos  3.3     09-27  Mg     1.9     09-28    Blood Culture: 09-26 @ 13:02  Organism --  Gram Stain Blood -- Gram Stain --  Specimen Source .Blood Blood  Culture-Blood --    09-26 @ 10:58  Organism --  Gram Stain Blood -- Gram Stain --  Specimen Source .Blood Blood  Culture-Blood --    09-25 @ 17:40  Organism --  Gram Stain Blood -- Gram Stain   Growth in aerobic bottle: Gram Negative Rods  Growth in anaerobic bottle: Gram Negative Rods  Specimen Source .Blood Blood-Peripheral  Culture-Blood --    09-25 @ 17:35  Organism Escherichia coli  Gram Stain Blood -- Gram Stain   Growth in aerobic bottle: Gram Negative Rods  Growth in anaerobic bottle: Gram Negative Rods  Specimen Source Clean Catch Clean Catch (Midstream)  Culture-Blood --    RADIOLOGY & ADDITIONAL STUDIES:  Reviewed     MEDICATIONS  (STANDING):  amLODIPine   Tablet 10 milliGRAM(s) Oral daily  cefTRIAXone Injectable. 2000 milliGRAM(s) IV Push every 24 hours  dextrose 5%. 1000 milliLiter(s) (50 mL/Hr) IV Continuous <Continuous>  dextrose 5%. 1000 milliLiter(s) (100 mL/Hr) IV Continuous <Continuous>  dextrose 50% Injectable 25 Gram(s) IV Push once  dextrose 50% Injectable 12.5 Gram(s) IV Push once  dextrose 50% Injectable 25 Gram(s) IV Push once  enoxaparin Injectable 40 milliGRAM(s) SubCutaneous every 24 hours  glucagon  Injectable 1 milliGRAM(s) IntraMuscular once  influenza  Vaccine (HIGH DOSE) 0.7 milliLiter(s) IntraMuscular once  insulin lispro (ADMELOG) corrective regimen sliding scale   SubCutaneous Before meals and at bedtime  losartan 100 milliGRAM(s) Oral daily  nystatin Powder 1 Application(s) Topical three times a day    MEDICATIONS  (PRN):  acetaminophen     Tablet .. 650 milliGRAM(s) Oral every 6 hours PRN Temp greater or equal to 38C (100.4F), Mild Pain (1 - 3)  aluminum hydroxide/magnesium hydroxide/simethicone Suspension 30 milliLiter(s) Oral every 4 hours PRN Dyspepsia  dextrose Oral Gel 15 Gram(s) Oral once PRN Blood Glucose LESS THAN 70 milliGRAM(s)/deciliter  guaiFENesin Oral Liquid (Sugar-Free) 100 milliGRAM(s) Oral every 6 hours PRN Cough  melatonin 3 milliGRAM(s) Oral at bedtime PRN Insomnia  ondansetron Injectable 4 milliGRAM(s) IV Push every 8 hours PRN Nausea and/or Vomiting        
Metabolic encephalopathy    HPI:  83F with PMHX of DM2, HTN, HLD, b/l leg swelling (on lasix), presenting to ED with  and daughter c/o multiple medical complaints including SOB/LEWIS x1 week as well as AMS, Fever/Chills described as "entire body shaking", and dysuria, nausea, and headache. Patient have taken Tylenol without any improvement. Denies chest pain, palpitation, vomiting, diarrhea. In ED patient noted to be confused, tachycardic, febrile with Tmax 103.6F rectal. Labs significant for leukocytosis. Patient triggered sepsis criteria and was treated with empiric IV aBX and goal directed IVF therapy. Subsequently became hypoxic with mild respiratory distress which was treated with O2 via NC and Lasix. CXR with significant BL pulmonary infiltrates bilaterally likely CHF. UA positive suspicious for UTI.     Unable to obtain ROS 2/2 AMS however remainder of ROS negative as per daughter providing HPI/ROS at bedside.  (26 Sep 2023 02:36)    Interval History:  Patient was seen and examined at bedside around 9:15 am with  - Carlos.  Feels much better.   Denies urinary symptoms.   Denies chest pain, palpitations, shortness of breath, headache, dizziness, visual symptoms, nausea, vomiting or abdominal pain.  Supplemental oxygen was weaned off.     ROS:  As per interval history otherwise unremarkable.    PHYSICAL EXAM:  Vital Signs   T(C): 37.6 (27 Sep 2023 16:00), Max: 37.6 (27 Sep 2023 16:00)  T(F): 99.6 (27 Sep 2023 16:00), Max: 99.6 (27 Sep 2023 16:00)  HR: 73 (27 Sep 2023 16:00) (69 - 82)  BP: 119/75 (27 Sep 2023 16:00) (112/65 - 137/74)  BP(mean): 90 (27 Sep 2023 16:00) (90 - 90)  RR: 18 (27 Sep 2023 16:00) (18 - 18)  SpO2: 97% (27 Sep 2023 16:00) (95% - 98%)  Parameters below as of 27 Sep 2023 16:00  Patient On (Oxygen Delivery Method): room air  General: Elderly female sitting in bed comfortably. No acute distress  HEENT: EOMI. Clear conjunctivae. Moist mucus membrane  Neck: Supple.   Chest: Good air entry. No wheezing, rales or rhonchi.   Heart: Normal S1 & S2. RRR.   Abdomen: Obese. Soft. Non-tender. + BS. No CVA tenderness.   Ext: No pedal edema. No calf tenderness   Neuro: Awake and alert. No focal deficit. No speech disorder  Skin: Warm and Dry  Psychiatry: Normal mood and affect    I&O's Summary    26 Sep 2023 07:01  -  27 Sep 2023 07:00  --------------------------------------------------------  IN: 840 mL / OUT: 0 mL / NET: 840 mL    LABS:  CAPILLARY BLOOD GLUCOSE  POCT Blood Glucose.: 110 mg/dL (27 Sep 2023 17:08)  POCT Blood Glucose.: 104 mg/dL (27 Sep 2023 13:03)  POCT Blood Glucose.: 85 mg/dL (27 Sep 2023 08:32)  POCT Blood Glucose.: 95 mg/dL (26 Sep 2023 21:36)                      10.2   8.95  )-----------( 176      ( 27 Sep 2023 04:33 )             29.0     09-27    131<L>  |  95<L>  |  15.8  ----------------------------<  77  3.4<L>   |  26.0  |  0.75    Ca    8.9      27 Sep 2023 04:33  Phos  3.3     09-27  Mg     1.9     09-27    TPro  6.7  /  Alb  3.3  /  TBili  0.3<L>  /  DBili  x   /  AST  20  /  ALT  18  /  AlkPhos  66  09-26    Urinalysis Basic - ( 27 Sep 2023 04:33 )    Color: x / Appearance: x / SG: x / pH: x  Gluc: 77 mg/dL / Ketone: x  / Bili: x / Urobili: x   Blood: x / Protein: x / Nitrite: x   Leuk Esterase: x / RBC: x / WBC x   Sq Epi: x / Non Sq Epi: x / Bacteria: x    RADIOLOGY & ADDITIONAL STUDIES:  Reviewed     MEDICATIONS  (STANDING):  amLODIPine   Tablet 10 milliGRAM(s) Oral daily  cefTRIAXone Injectable. 2000 milliGRAM(s) IV Push every 24 hours  dextrose 5%. 1000 milliLiter(s) (50 mL/Hr) IV Continuous <Continuous>  dextrose 5%. 1000 milliLiter(s) (100 mL/Hr) IV Continuous <Continuous>  dextrose 50% Injectable 12.5 Gram(s) IV Push once  dextrose 50% Injectable 25 Gram(s) IV Push once  dextrose 50% Injectable 25 Gram(s) IV Push once  enoxaparin Injectable 40 milliGRAM(s) SubCutaneous every 24 hours  furosemide   Injectable 40 milliGRAM(s) IV Push two times a day  glucagon  Injectable 1 milliGRAM(s) IntraMuscular once  influenza  Vaccine (HIGH DOSE) 0.7 milliLiter(s) IntraMuscular once  insulin lispro (ADMELOG) corrective regimen sliding scale   SubCutaneous Before meals and at bedtime  losartan 100 milliGRAM(s) Oral daily  nystatin Powder 1 Application(s) Topical three times a day    MEDICATIONS  (PRN):  acetaminophen     Tablet .. 650 milliGRAM(s) Oral every 6 hours PRN Temp greater or equal to 38C (100.4F), Mild Pain (1 - 3)  aluminum hydroxide/magnesium hydroxide/simethicone Suspension 30 milliLiter(s) Oral every 4 hours PRN Dyspepsia  dextrose Oral Gel 15 Gram(s) Oral once PRN Blood Glucose LESS THAN 70 milliGRAM(s)/deciliter  melatonin 3 milliGRAM(s) Oral at bedtime PRN Insomnia  ondansetron Injectable 4 milliGRAM(s) IV Push every 8 hours PRN Nausea and/or Vomiting      
[facility] Division of Hospital Medicine    Chief Complaint:  Sepsis     SUBJECTIVE / OVERNIGHT EVENTS:  Pt examined lying in bed  Feels better today   Patient denies chest pain, SOB, abd pain, N/V, fever, chills, dysuria or any other complaints. All remainder ROS negative.     MEDICATIONS  (STANDING):  amLODIPine   Tablet 10 milliGRAM(s) Oral daily  cefTRIAXone Injectable. 2000 milliGRAM(s) IV Push every 24 hours  dextrose 5%. 1000 milliLiter(s) (50 mL/Hr) IV Continuous <Continuous>  dextrose 5%. 1000 milliLiter(s) (100 mL/Hr) IV Continuous <Continuous>  dextrose 50% Injectable 25 Gram(s) IV Push once  dextrose 50% Injectable 12.5 Gram(s) IV Push once  dextrose 50% Injectable 25 Gram(s) IV Push once  enoxaparin Injectable 40 milliGRAM(s) SubCutaneous every 24 hours  glucagon  Injectable 1 milliGRAM(s) IntraMuscular once  influenza  Vaccine (HIGH DOSE) 0.7 milliLiter(s) IntraMuscular once  insulin lispro (ADMELOG) corrective regimen sliding scale   SubCutaneous Before meals and at bedtime  losartan 100 milliGRAM(s) Oral daily  nystatin Powder 1 Application(s) Topical three times a day    MEDICATIONS  (PRN):  acetaminophen     Tablet .. 650 milliGRAM(s) Oral every 6 hours PRN Temp greater or equal to 38C (100.4F), Mild Pain (1 - 3)  aluminum hydroxide/magnesium hydroxide/simethicone Suspension 30 milliLiter(s) Oral every 4 hours PRN Dyspepsia  dextrose Oral Gel 15 Gram(s) Oral once PRN Blood Glucose LESS THAN 70 milliGRAM(s)/deciliter  guaiFENesin Oral Liquid (Sugar-Free) 100 milliGRAM(s) Oral every 6 hours PRN Cough  melatonin 3 milliGRAM(s) Oral at bedtime PRN Insomnia  ondansetron Injectable 4 milliGRAM(s) IV Push every 8 hours PRN Nausea and/or Vomiting        I&O's Summary    29 Sep 2023 07:01  -  30 Sep 2023 07:00  --------------------------------------------------------  IN: 175 mL / OUT: 500 mL / NET: -325 mL        PHYSICAL EXAM:  Vital Signs Last 24 Hrs  T(C): 36.6 (30 Sep 2023 09:03), Max: 37.1 (29 Sep 2023 20:45)  T(F): 97.8 (30 Sep 2023 09:03), Max: 98.7 (29 Sep 2023 20:45)  HR: 89 (30 Sep 2023 09:03) (67 - 89)  BP: 129/72 (30 Sep 2023 09:03) (127/68 - 136/75)  BP(mean): --  RR: 18 (30 Sep 2023 09:03) (18 - 18)  SpO2: 94% (30 Sep 2023 09:03) (94% - 97%)    Parameters below as of 30 Sep 2023 09:03  Patient On (Oxygen Delivery Method): room air            CONSTITUTIONAL: Non toxic appearing, lying in bed  ENMT: Moist oral mucosa, no pharyngeal injection or exudates;   RESPIRATORY: Normal respiratory effort; lungs are clear to auscultation bilaterally  CARDIOVASCULAR: Regular rate and rhythm, normal S1 and S2, no murmur/rub/gallop; No lower extremity edema; Peripheral pulses are 2+ bilaterally  ABDOMEN: Nontender to palpation, normoactive bowel sounds, no rebound/guarding; No hepatosplenomegaly  MUSCLOSKELETAL:  SLR +> 40 degrees R>L,  no clubbing or cyanosis of digits; no joint swelling or tenderness to palpation  PSYCH: A+O to person, place, and time; affect appropriate  NEUROLOGY: CN 2-12 are intact and symmetric; no gross sensory deficits;   SKIN: No rashes; no palpable lesions    LABS:                        11.7   7.04  )-----------( 301      ( 30 Sep 2023 04:45 )             35.7     09-30    137  |  99  |  10.6  ----------------------------<  138<H>  5.1   |  26.0  |  0.72    Ca    10.0      30 Sep 2023 04:45  Phos  3.1     09-30  Mg     2.2     09-30    TPro  6.2<L>  /  Alb  3.0<L>  /  TBili  0.2<L>  /  DBili  x   /  AST  33<H>  /  ALT  32  /  AlkPhos  61  09-30    PT/INR - ( 30 Sep 2023 01:16 )   PT: 12.0 sec;   INR: 1.08 ratio         PTT - ( 30 Sep 2023 01:16 )  PTT:30.0 sec      Urinalysis Basic - ( 30 Sep 2023 04:45 )    Color: x / Appearance: x / SG: x / pH: x  Gluc: 138 mg/dL / Ketone: x  / Bili: x / Urobili: x   Blood: x / Protein: x / Nitrite: x   Leuk Esterase: x / RBC: x / WBC x   Sq Epi: x / Non Sq Epi: x / Bacteria: x        CAPILLARY BLOOD GLUCOSE      POCT Blood Glucose.: 142 mg/dL (30 Sep 2023 16:53)  POCT Blood Glucose.: 195 mg/dL (30 Sep 2023 12:30)  POCT Blood Glucose.: 133 mg/dL (30 Sep 2023 07:58)  POCT Blood Glucose.: 155 mg/dL (29 Sep 2023 21:22)        RADIOLOGY & ADDITIONAL TESTS:  Results Reviewed:   Imaging Personally Reviewed:  Electrocardiogram Personally Reviewed:                                          
Metabolic encephalopathy    HPI:  83F with PMHX of DM2, HTN, HLD, b/l leg swelling (on lasix), presenting to ED with  and daughter c/o multiple medical complaints including SOB/LEWIS x1 week as well as AMS, Fever/Chills described as "entire body shaking", and dysuria, nausea, and headache. Patient have taken Tylenol without any improvement. Denies chest pain, palpitation, vomiting, diarrhea. In ED patient noted to be confused, tachycardic, febrile with Tmax 103.6F rectal. Labs significant for leukocytosis. Patient triggered sepsis criteria and was treated with empiric IV aBX and goal directed IVF therapy. Subsequently became hypoxic with mild respiratory distress which was treated with O2 via NC and Lasix. CXR with significant BL pulmonary infiltrates bilaterally likely CHF. UA positive suspicious for UTI.     Unable to obtain ROS 2/2 AMS however remainder of ROS negative as per daughter providing HPI/ROS at bedside.  (26 Sep 2023 02:36)    Interval History:  Patient was seen and examined at bedside   Has difficulty ambulating due to weakness and Hip pain   Denies urinary symptoms.   Denies nausea, vomiting or abdominal pain.    ROS:  As per interval history otherwise unremarkable.    PHYSICAL EXAM:  Vital Signs Last 24 Hrs  T(C): 36.9 (29 Sep 2023 16:40), Max: 37.1 (28 Sep 2023 21:58)  T(F): 98.5 (29 Sep 2023 16:40), Max: 98.8 (28 Sep 2023 21:58)  HR: 76 (29 Sep 2023 16:40) (61 - 76)  BP: 144/79 (29 Sep 2023 16:40) (123/76 - 148/72)  BP(mean): --  RR: 18 (29 Sep 2023 16:40) (18 - 18)  SpO2: 99% (29 Sep 2023 16:40) (93% - 99%)    Parameters below as of 29 Sep 2023 16:40  Patient On (Oxygen Delivery Method): room air      General: Elderly female sitting in bed comfortably. No acute distress  HEENT: EOMI. Clear conjunctivae. Moist mucus membrane  Neck: Supple.   Chest: Good air entry. No wheezing, rales or rhonchi.   Heart: Normal S1 & S2. RRR.   Abdomen: Obese. Soft. Non-tender. + BS. No CVA tenderness.   Ext: No pedal edema. No calf tenderness   Neuro: Awake and alert. No focal deficit. No speech disorder  Skin: Warm and Dry  Psychiatry: Normal mood and affect    LABS:  CAPILLARY BLOOD GLUCOSE      POCT Blood Glucose.: 131 mg/dL (29 Sep 2023 16:53)  POCT Blood Glucose.: 165 mg/dL (29 Sep 2023 12:49)  POCT Blood Glucose.: 146 mg/dL (29 Sep 2023 08:50)  POCT Blood Glucose.: 158 mg/dL (28 Sep 2023 22:58)                                      10.6   5.70  )-----------( 230      ( 29 Sep 2023 05:23 )             32.2   09-29    134<L>  |  98  |  12.7  ----------------------------<  136<H>  4.8   |  26.0  |  0.62    Ca    9.7      29 Sep 2023 05:23  Mg     2.1     09-29        Blood Culture: 09-26 @ 13:02  Organism --  Gram Stain Blood -- Gram Stain --  Specimen Source .Blood Blood  Culture-Blood --    09-26 @ 10:58  Organism --  Gram Stain Blood -- Gram Stain --  Specimen Source .Blood Blood  Culture-Blood --    09-25 @ 17:40  Organism --  Gram Stain Blood -- Gram Stain   Growth in aerobic bottle: Gram Negative Rods  Growth in anaerobic bottle: Gram Negative Rods  Specimen Source .Blood Blood-Peripheral  Culture-Blood --    09-25 @ 17:35  Organism Escherichia coli  Gram Stain Blood -- Gram Stain   Growth in aerobic bottle: Gram Negative Rods  Growth in anaerobic bottle: Gram Negative Rods  Specimen Source Clean Catch Clean Catch (Midstream)  Culture-Blood --    RADIOLOGY & ADDITIONAL STUDIES:  Reviewed     MEDICATIONS  (STANDING):  amLODIPine   Tablet 10 milliGRAM(s) Oral daily  cefTRIAXone Injectable. 2000 milliGRAM(s) IV Push every 24 hours  dextrose 5%. 1000 milliLiter(s) (50 mL/Hr) IV Continuous <Continuous>  dextrose 5%. 1000 milliLiter(s) (100 mL/Hr) IV Continuous <Continuous>  dextrose 50% Injectable 25 Gram(s) IV Push once  dextrose 50% Injectable 12.5 Gram(s) IV Push once  dextrose 50% Injectable 25 Gram(s) IV Push once  enoxaparin Injectable 40 milliGRAM(s) SubCutaneous every 24 hours  glucagon  Injectable 1 milliGRAM(s) IntraMuscular once  influenza  Vaccine (HIGH DOSE) 0.7 milliLiter(s) IntraMuscular once  insulin lispro (ADMELOG) corrective regimen sliding scale   SubCutaneous Before meals and at bedtime  losartan 100 milliGRAM(s) Oral daily  nystatin Powder 1 Application(s) Topical three times a day    MEDICATIONS  (PRN):  acetaminophen     Tablet .. 650 milliGRAM(s) Oral every 6 hours PRN Temp greater or equal to 38C (100.4F), Mild Pain (1 - 3)  aluminum hydroxide/magnesium hydroxide/simethicone Suspension 30 milliLiter(s) Oral every 4 hours PRN Dyspepsia  dextrose Oral Gel 15 Gram(s) Oral once PRN Blood Glucose LESS THAN 70 milliGRAM(s)/deciliter  guaiFENesin Oral Liquid (Sugar-Free) 100 milliGRAM(s) Oral every 6 hours PRN Cough  melatonin 3 milliGRAM(s) Oral at bedtime PRN Insomnia  ondansetron Injectable 4 milliGRAM(s) IV Push every 8 hours PRN Nausea and/or Vomiting        
[facility] Division of Hospital Medicine    Chief Complaint:  Sepsis     SUBJECTIVE / OVERNIGHT EVENTS:  Pt examined lying in bed  Feels better today   Patient denies chest pain, SOB, abd pain, N/V, fever, chills, dysuria or any other complaints. All remainder ROS negative.     MEDICATIONS  (STANDING):  amLODIPine   Tablet 10 milliGRAM(s) Oral daily  cefTRIAXone Injectable. 2000 milliGRAM(s) IV Push every 24 hours  dextrose 5%. 1000 milliLiter(s) (50 mL/Hr) IV Continuous <Continuous>  dextrose 5%. 1000 milliLiter(s) (100 mL/Hr) IV Continuous <Continuous>  dextrose 50% Injectable 25 Gram(s) IV Push once  dextrose 50% Injectable 12.5 Gram(s) IV Push once  dextrose 50% Injectable 25 Gram(s) IV Push once  enoxaparin Injectable 40 milliGRAM(s) SubCutaneous every 24 hours  glucagon  Injectable 1 milliGRAM(s) IntraMuscular once  influenza  Vaccine (HIGH DOSE) 0.7 milliLiter(s) IntraMuscular once  insulin lispro (ADMELOG) corrective regimen sliding scale   SubCutaneous Before meals and at bedtime  losartan 100 milliGRAM(s) Oral daily  nystatin Powder 1 Application(s) Topical three times a day    MEDICATIONS  (PRN):  acetaminophen     Tablet .. 650 milliGRAM(s) Oral every 6 hours PRN Temp greater or equal to 38C (100.4F), Mild Pain (1 - 3)  aluminum hydroxide/magnesium hydroxide/simethicone Suspension 30 milliLiter(s) Oral every 4 hours PRN Dyspepsia  dextrose Oral Gel 15 Gram(s) Oral once PRN Blood Glucose LESS THAN 70 milliGRAM(s)/deciliter  guaiFENesin Oral Liquid (Sugar-Free) 100 milliGRAM(s) Oral every 6 hours PRN Cough  melatonin 3 milliGRAM(s) Oral at bedtime PRN Insomnia  ondansetron Injectable 4 milliGRAM(s) IV Push every 8 hours PRN Nausea and/or Vomiting          PHYSICAL EXAM:  Vital Signs Last 24 Hrs  T(C): 36.6 (01 Oct 2023 04:54), Max: 36.7 (30 Sep 2023 12:00)  T(F): 97.8 (01 Oct 2023 04:54), Max: 98.1 (30 Sep 2023 21:00)  HR: 61 (01 Oct 2023 04:54) (61 - 76)  BP: 149/75 (01 Oct 2023 04:54) (114/74 - 149/75)  BP(mean): --  RR: 18 (01 Oct 2023 04:54) (17 - 18)  SpO2: 99% (01 Oct 2023 04:54) (94% - 99%)    Parameters below as of 01 Oct 2023 04:54  Patient On (Oxygen Delivery Method): room air          CONSTITUTIONAL: Non toxic appearing, lying in bed  ENMT: Moist oral mucosa, no pharyngeal injection or exudates;   RESPIRATORY: Normal respiratory effort; lungs are clear to auscultation bilaterally  CARDIOVASCULAR: Regular rate and rhythm, normal S1 and S2, no murmur/rub/gallop; No lower extremity edema; Peripheral pulses are 2+ bilaterally  ABDOMEN: Nontender to palpation, normoactive bowel sounds, no rebound/guarding; No hepatosplenomegaly  MUSCLOSKELETAL:  SLR +> 40 degrees R>L,  no clubbing or cyanosis of digits; no joint swelling or tenderness to palpation  PSYCH: A+O to person, place, and time; affect appropriate  NEUROLOGY: CN 2-12 are intact and symmetric; no gross sensory deficits;   SKIN: No rashes; no palpable lesions    LABS:                        11.7   7.04  )-----------( 301      ( 30 Sep 2023 04:45 )             35.7     09-30    137  |  99  |  10.6  ----------------------------<  138<H>  5.1   |  26.0  |  0.72    Ca    10.0      30 Sep 2023 04:45  Phos  3.1     09-30  Mg     2.2     09-30    TPro  6.2<L>  /  Alb  3.0<L>  /  TBili  0.2<L>  /  DBili  x   /  AST  33<H>  /  ALT  32  /  AlkPhos  61  09-30    PT/INR - ( 30 Sep 2023 01:16 )   PT: 12.0 sec;   INR: 1.08 ratio         PTT - ( 30 Sep 2023 01:16 )  PTT:30.0 sec      Urinalysis Basic - ( 30 Sep 2023 04:45 )    Color: x / Appearance: x / SG: x / pH: x  Gluc: 138 mg/dL / Ketone: x  / Bili: x / Urobili: x   Blood: x / Protein: x / Nitrite: x   Leuk Esterase: x / RBC: x / WBC x   Sq Epi: x / Non Sq Epi: x / Bacteria: x        CAPILLARY BLOOD GLUCOSE      POCT Blood Glucose.: 142 mg/dL (30 Sep 2023 16:53)  POCT Blood Glucose.: 195 mg/dL (30 Sep 2023 12:30)  POCT Blood Glucose.: 133 mg/dL (30 Sep 2023 07:58)  POCT Blood Glucose.: 155 mg/dL (29 Sep 2023 21:22)        RADIOLOGY & ADDITIONAL TESTS:  Results Reviewed:   Imaging Personally Reviewed:  Electrocardiogram Personally Reviewed:

## 2023-10-01 NOTE — PROGRESS NOTE ADULT - NUTRITIONAL ASSESSMENT
This patient has been assessed with a concern for Malnutrition and has been determined to have a diagnosis/diagnoses of Severe protein-calorie malnutrition.    This patient is being managed with:   Diet DASH/TLC-  Sodium & Cholesterol Restricted  Consistent Carbohydrate {Evening Snack} (CSTCHOSN)  1200mL Fluid Restriction ([id number])  Entered: Sep 26 2023  4:33AM  
This patient has been assessed with a concern for Malnutrition and has been determined to have a diagnosis/diagnoses of Severe protein-calorie malnutrition.    This patient is being managed with:   Diet DASH/TLC-  Sodium & Cholesterol Restricted  Consistent Carbohydrate {Evening Snack} (CSTCHOSN)  1200mL Fluid Restriction ([id number])  Entered: Sep 26 2023  4:33AM  
This patient has been assessed with a concern for Malnutrition and has been determined to have a diagnosis/diagnoses of Severe protein-calorie malnutrition.    This patient is being managed with:   Diet DASH/TLC-  Sodium & Cholesterol Restricted  Consistent Carbohydrate {Evening Snack} (CSTCHOSN)  1200mL Fluid Restriction ([id number])  Entered: Sep 26 2023  4:33AM  
This patient has been assessed with a concern for Malnutrition and has been determined to have a diagnosis/diagnoses of Severe protein-calorie malnutrition.    This patient is being managed with:   Diet DASH/TLC-  Sodium & Cholesterol Restricted  Consistent Carbohydrate {Evening Snack} (CSTCHOSN)  1200mL Fluid Restriction ([id number])  Entered: Sep 26 2023  4:33AM

## 2023-10-02 ENCOUNTER — TRANSCRIPTION ENCOUNTER (OUTPATIENT)
Age: 83
End: 2023-10-02

## 2023-10-02 VITALS
DIASTOLIC BLOOD PRESSURE: 72 MMHG | SYSTOLIC BLOOD PRESSURE: 131 MMHG | HEART RATE: 64 BPM | RESPIRATION RATE: 18 BRPM | OXYGEN SATURATION: 96 % | TEMPERATURE: 98 F

## 2023-10-02 LAB
GLUCOSE BLDC GLUCOMTR-MCNC: 131 MG/DL — HIGH (ref 70–99)
GLUCOSE BLDC GLUCOMTR-MCNC: 181 MG/DL — HIGH (ref 70–99)

## 2023-10-02 PROCEDURE — 82553 CREATINE MB FRACTION: CPT

## 2023-10-02 PROCEDURE — 82570 ASSAY OF URINE CREATININE: CPT

## 2023-10-02 PROCEDURE — 84300 ASSAY OF URINE SODIUM: CPT

## 2023-10-02 PROCEDURE — 84132 ASSAY OF SERUM POTASSIUM: CPT

## 2023-10-02 PROCEDURE — 84295 ASSAY OF SERUM SODIUM: CPT

## 2023-10-02 PROCEDURE — 83880 ASSAY OF NATRIURETIC PEPTIDE: CPT

## 2023-10-02 PROCEDURE — 99285 EMERGENCY DEPT VISIT HI MDM: CPT | Mod: 25

## 2023-10-02 PROCEDURE — 83930 ASSAY OF BLOOD OSMOLALITY: CPT

## 2023-10-02 PROCEDURE — 74183 MRI ABD W/O CNTR FLWD CNTR: CPT

## 2023-10-02 PROCEDURE — 84156 ASSAY OF PROTEIN URINE: CPT

## 2023-10-02 PROCEDURE — 87150 DNA/RNA AMPLIFIED PROBE: CPT

## 2023-10-02 PROCEDURE — 83935 ASSAY OF URINE OSMOLALITY: CPT

## 2023-10-02 PROCEDURE — 85027 COMPLETE CBC AUTOMATED: CPT

## 2023-10-02 PROCEDURE — 73060 X-RAY EXAM OF HUMERUS: CPT

## 2023-10-02 PROCEDURE — 99239 HOSP IP/OBS DSCHRG MGMT >30: CPT

## 2023-10-02 PROCEDURE — 81001 URINALYSIS AUTO W/SCOPE: CPT

## 2023-10-02 PROCEDURE — 85018 HEMOGLOBIN: CPT

## 2023-10-02 PROCEDURE — 87186 SC STD MICRODIL/AGAR DIL: CPT

## 2023-10-02 PROCEDURE — 80053 COMPREHEN METABOLIC PANEL: CPT

## 2023-10-02 PROCEDURE — 84484 ASSAY OF TROPONIN QUANT: CPT

## 2023-10-02 PROCEDURE — 87086 URINE CULTURE/COLONY COUNT: CPT

## 2023-10-02 PROCEDURE — 85610 PROTHROMBIN TIME: CPT

## 2023-10-02 PROCEDURE — 93005 ELECTROCARDIOGRAM TRACING: CPT

## 2023-10-02 PROCEDURE — 82803 BLOOD GASES ANY COMBINATION: CPT

## 2023-10-02 PROCEDURE — 84540 ASSAY OF URINE/UREA-N: CPT

## 2023-10-02 PROCEDURE — 82330 ASSAY OF CALCIUM: CPT

## 2023-10-02 PROCEDURE — 96374 THER/PROPH/DIAG INJ IV PUSH: CPT

## 2023-10-02 PROCEDURE — 87077 CULTURE AEROBIC IDENTIFY: CPT

## 2023-10-02 PROCEDURE — 87040 BLOOD CULTURE FOR BACTERIA: CPT

## 2023-10-02 PROCEDURE — 83735 ASSAY OF MAGNESIUM: CPT

## 2023-10-02 PROCEDURE — 96375 TX/PRO/DX INJ NEW DRUG ADDON: CPT

## 2023-10-02 PROCEDURE — 74177 CT ABD & PELVIS W/CONTRAST: CPT

## 2023-10-02 PROCEDURE — 82962 GLUCOSE BLOOD TEST: CPT

## 2023-10-02 PROCEDURE — 85014 HEMATOCRIT: CPT

## 2023-10-02 PROCEDURE — 85730 THROMBOPLASTIN TIME PARTIAL: CPT

## 2023-10-02 PROCEDURE — 84100 ASSAY OF PHOSPHORUS: CPT

## 2023-10-02 PROCEDURE — 80048 BASIC METABOLIC PNL TOTAL CA: CPT

## 2023-10-02 PROCEDURE — 36415 COLL VENOUS BLD VENIPUNCTURE: CPT

## 2023-10-02 PROCEDURE — 83605 ASSAY OF LACTIC ACID: CPT

## 2023-10-02 PROCEDURE — 85025 COMPLETE CBC W/AUTO DIFF WBC: CPT

## 2023-10-02 PROCEDURE — 82550 ASSAY OF CK (CPK): CPT

## 2023-10-02 PROCEDURE — 73502 X-RAY EXAM HIP UNI 2-3 VIEWS: CPT

## 2023-10-02 PROCEDURE — C8929: CPT

## 2023-10-02 PROCEDURE — 84145 PROCALCITONIN (PCT): CPT

## 2023-10-02 PROCEDURE — 71045 X-RAY EXAM CHEST 1 VIEW: CPT

## 2023-10-02 PROCEDURE — 0225U NFCT DS DNA&RNA 21 SARSCOV2: CPT

## 2023-10-02 PROCEDURE — 70450 CT HEAD/BRAIN W/O DYE: CPT | Mod: MA

## 2023-10-02 PROCEDURE — 83036 HEMOGLOBIN GLYCOSYLATED A1C: CPT

## 2023-10-02 PROCEDURE — 82435 ASSAY OF BLOOD CHLORIDE: CPT

## 2023-10-02 PROCEDURE — 84133 ASSAY OF URINE POTASSIUM: CPT

## 2023-10-02 PROCEDURE — 82947 ASSAY GLUCOSE BLOOD QUANT: CPT

## 2023-10-02 RX ORDER — CEFPODOXIME PROXETIL 100 MG
1 TABLET ORAL
Qty: 6 | Refills: 0
Start: 2023-10-02 | End: 2023-10-04

## 2023-10-02 RX ADMIN — Medication 650 MILLIGRAM(S): at 06:00

## 2023-10-02 RX ADMIN — Medication 1: at 12:37

## 2023-10-02 RX ADMIN — LOSARTAN POTASSIUM 100 MILLIGRAM(S): 100 TABLET, FILM COATED ORAL at 05:27

## 2023-10-02 RX ADMIN — Medication 650 MILLIGRAM(S): at 05:29

## 2023-10-02 RX ADMIN — NYSTATIN CREAM 1 APPLICATION(S): 100000 CREAM TOPICAL at 05:28

## 2023-10-02 RX ADMIN — ENOXAPARIN SODIUM 40 MILLIGRAM(S): 100 INJECTION SUBCUTANEOUS at 05:27

## 2023-10-02 RX ADMIN — AMLODIPINE BESYLATE 10 MILLIGRAM(S): 2.5 TABLET ORAL at 05:27

## 2023-10-02 RX ADMIN — CEFTRIAXONE 2000 MILLIGRAM(S): 500 INJECTION, POWDER, FOR SOLUTION INTRAMUSCULAR; INTRAVENOUS at 11:41

## 2023-10-02 NOTE — PHYSICAL THERAPY INITIAL EVALUATION ADULT - ADDITIONAL COMMENTS
Pt Axox4 states she lives at home with  without steps, and was independent PTA with use of RW. Pt daughter and  present to verify info

## 2023-10-02 NOTE — DISCHARGE NOTE NURSING/CASE MANAGEMENT/SOCIAL WORK - NSDCPEFALRISK_GEN_ALL_CORE
For information on Fall & Injury Prevention, visit: [email or web address] OR  [email or web address] OR  https://www.cdc.gov/steadi/patient.html

## 2023-10-02 NOTE — DISCHARGE NOTE PROVIDER - HOSPITAL COURSE
83F with PMHX of DM2, HTN, HLD, b/l leg swelling (on lasix), presenting to ED with  and daughter c/o multiple medical complaints including SOB/LEWIS x1 week as well as AMS, Fever/Chills described as "entire body shaking", and dysuria, nausea, and headache.  CT head negative.  Patient admitted with UTI, found to have E. Coli bacteremia.  Patient treated with IV antibiotics and transitioned to PO antibiotics to complete course.  Repeat blood cultures negative.  CT A/P noted with liver lesions.  MRI Abdomen noted with liver lesions. GI consulted - Lesions cysts of undetermined significance, likely benign; recommended f/u at appropriate interval to ensure stability. PT evaluated patient.  Patient stable for discharge to home with home care and outpatient follow up with primary doctor and GI.      Patient seen and examined with daughter at bedside providing Turkmen translation at patient's request.    Vital Signs Last 24 Hrs  T(C): 36.7 (02 Oct 2023 09:17), Max: 36.9 (01 Oct 2023 16:22)  T(F): 98 (02 Oct 2023 09:17), Max: 98.5 (02 Oct 2023 05:15)  HR: 67 (02 Oct 2023 09:17) (61 - 68)  BP: 131/69 (02 Oct 2023 09:17) (114/62 - 144/83)  BP(mean): --  RR: 18 (02 Oct 2023 09:17) (18 - 18)  SpO2: 98% (02 Oct 2023 09:17) (92% - 98%)    Parameters below as of 02 Oct 2023 09:17  Patient On (Oxygen Delivery Method): room air    PHYSICAL EXAM:  GENERAL: NAD  HEAD:  Atraumatic, Normocephalic  NECK: Supple, No JVD, Normal thyroid  NERVOUS SYSTEM:  Alert & Oriented X3, Good concentration; Motor Strength 5/5 B/L upper and lower extremities  CHEST/LUNG: Clear to auscultation bilaterally  HEART: Regular rate and rhythm; No murmurs, rubs, or gallops  ABDOMEN: Soft, Nontender, Nondistended; Bowel sounds present  EXTREMITIES:  2+ Peripheral Pulses, No clubbing, cyanosis, or edema  SKIN: No rashes or lesions

## 2023-10-02 NOTE — DISCHARGE NOTE PROVIDER - CARE PROVIDER_API CALL
Emmy Monk  Internal Medicine, Gastroenterology  [address], [address]  [place] [address]  Phone: [phone]  Fax: [phone]  Follow Up Time:     Primary doctor,   Phone: (   )    -  Fax: (   )    -  Follow Up Time:

## 2023-10-02 NOTE — PHYSICAL THERAPY INITIAL EVALUATION ADULT - PERTINENT HX OF CURRENT PROBLEM, REHAB EVAL
83F with PMHX of DM2, HTN, HLD, b/l leg swelling (on lasix), presenting to ED with  and daughter c/o multiple medical complaints including SOB/LEWIS x1 week as well as AMS, Fever/Chills described as "entire body shaking", and dysuria, nausea, and headache. Patient have taken Tylenol without any improvement. Denies chest pain, palpitation, vomiting, diarrhea. In ED patient noted to be confused, tachycardic, febrile with Tmax 103.6F rectal. Labs significant for leukocytosis. Patient triggered sepsis criteria and was treated with empiric IV aBX and goal directed IVF therapy. Subsequently became hypoxic with mild respiratory distress which was treated with O2 via NC and Lasix. CXR with significant BL pulmonary infiltrates bilaterally likely CHF. UA positive suspicious for UTI.

## 2023-10-02 NOTE — DISCHARGE NOTE PROVIDER - ATTENDING DISCHARGE PHYSICAL EXAMINATION:
Gen : Non toxic appearing obese female, comfortable, NAD  HEENT : NCAT, MMM, No cervical lymphadenopathy, no JVD  CVS : S1S2, regular rate and rhythm, no murmurs  Resp : CTA b/l, no rhonchi or wheezes appreciated   Abd : Soft, non distended, non tender, BS +ve   MSK : No joint swelling or tenderness, no digital clubbing, no distal cyanosis  Neuro : CN II-XII intact, no focal motor or sensory deficits   Psych : Pleasant, no anxiety, normal affect

## 2023-10-02 NOTE — DISCHARGE NOTE PROVIDER - NSDCCPCAREPLAN_GEN_ALL_CORE_FT
PRINCIPAL DISCHARGE DIAGNOSIS  Diagnosis: Acute UTI  Assessment and Plan of Treatment: Complete antibiotic course.    Follow up with primary doctor.      SECONDARY DISCHARGE DIAGNOSES  Diagnosis: Liver lesion  Assessment and Plan of Treatment: Follow up with GI for follow up imaging.

## 2023-10-02 NOTE — DISCHARGE NOTE PROVIDER - PROVIDER TOKENS
PROVIDER:[TOKEN:[[id number]]],FREE:[LAST:[Primary doctor],PHONE:[(   )    -],FAX:[(   )    -]]

## 2023-10-02 NOTE — DISCHARGE NOTE PROVIDER - DETAILS OF MALNUTRITION DIAGNOSIS/DIAGNOSES
Yes
This patient has been assessed with a concern for Malnutrition and was treated during this hospitalization for the following Nutrition diagnosis/diagnoses:     -  09/28/2023: Severe protein-calorie malnutrition

## 2023-10-02 NOTE — DISCHARGE NOTE NURSING/CASE MANAGEMENT/SOCIAL WORK - PATIENT PORTAL LINK FT
You can access the FollowMyHealth Patient Portal offered by [facility] by registering at the following website: [email or web address]. By joining [facility]’s FollowMyHealth portal, you will also be able to view your health information using other applications (apps) compatible with our system.

## 2023-10-02 NOTE — DISCHARGE NOTE PROVIDER - NSDCMRMEDTOKEN_GEN_ALL_CORE_FT
amLODIPine 10 mg oral tablet: 1 tab(s) orally once a day  cefpodoxime 200 mg oral tablet: 1 tab(s) orally 2 times a day  furosemide 40 mg oral tablet: 1 tab(s) orally once a day  glimepiride 4 mg oral tablet: 1 tab(s) orally once a day  metFORMIN 850 mg oral tablet: 1 tab(s) orally 2 times a day  olmesartan-hydrochlorothiazide 40 mg-12.5 mg oral tablet: 1 tab(s) orally once a day

## 2023-11-17 ENCOUNTER — OFFICE (OUTPATIENT)
Dept: URBAN - METROPOLITAN AREA CLINIC 63 | Facility: CLINIC | Age: 83
Setting detail: OPHTHALMOLOGY
End: 2023-11-17
Payer: COMMERCIAL

## 2023-11-17 DIAGNOSIS — E11.3312: ICD-10-CM

## 2023-11-17 PROCEDURE — 67210 TREATMENT OF RETINAL LESION: CPT | Mod: 79,LT | Performed by: SPECIALIST

## 2023-11-17 ASSESSMENT — REFRACTION_AUTOREFRACTION
OD_CYLINDER: -2.50
OD_SPHERE: +1.25
OD_AXIS: 091
OS_CYLINDER: -2.50
OS_SPHERE: +2.75
OS_AXIS: 097

## 2023-11-17 ASSESSMENT — SPHEQUIV_DERIVED
OD_SPHEQUIV: 0
OS_SPHEQUIV: 1.5

## 2023-11-17 ASSESSMENT — CONFRONTATIONAL VISUAL FIELD TEST (CVF)
OD_FINDINGS: FULL
OS_FINDINGS: FULL

## 2024-02-16 ENCOUNTER — OFFICE (OUTPATIENT)
Dept: URBAN - METROPOLITAN AREA CLINIC 63 | Facility: CLINIC | Age: 84
Setting detail: OPHTHALMOLOGY
End: 2024-02-16
Payer: COMMERCIAL

## 2024-02-16 DIAGNOSIS — H35.3233: ICD-10-CM

## 2024-02-16 DIAGNOSIS — E11.3313: ICD-10-CM

## 2024-02-16 DIAGNOSIS — E11.3311: ICD-10-CM

## 2024-02-16 PROCEDURE — 67210 TREATMENT OF RETINAL LESION: CPT | Mod: 79,RT | Performed by: SPECIALIST

## 2024-02-16 PROCEDURE — 92134 CPTRZ OPH DX IMG PST SGM RTA: CPT | Performed by: SPECIALIST

## 2024-02-16 PROCEDURE — 92235 FLUORESCEIN ANGRPH MLTIFRAME: CPT | Performed by: SPECIALIST

## 2024-02-16 PROCEDURE — 92012 INTRM OPH EXAM EST PATIENT: CPT | Mod: 25 | Performed by: SPECIALIST

## 2024-02-16 ASSESSMENT — REFRACTION_AUTOREFRACTION
OS_SPHERE: +2.75
OD_SPHERE: +1.25
OS_CYLINDER: -2.50
OD_CYLINDER: -2.50
OD_AXIS: 091
OS_AXIS: 097

## 2024-02-16 ASSESSMENT — SPHEQUIV_DERIVED
OS_SPHEQUIV: 1.5
OD_SPHEQUIV: 0

## 2024-03-15 ENCOUNTER — OFFICE (OUTPATIENT)
Dept: URBAN - METROPOLITAN AREA CLINIC 63 | Facility: CLINIC | Age: 84
Setting detail: OPHTHALMOLOGY
End: 2024-03-15
Payer: COMMERCIAL

## 2024-03-15 DIAGNOSIS — E11.3312: ICD-10-CM

## 2024-03-15 PROCEDURE — 67210 TREATMENT OF RETINAL LESION: CPT | Mod: 79,LT | Performed by: SPECIALIST

## 2024-04-15 ASSESSMENT — KERATOMETRY
OD_K1POWER_DIOPTERS: 43.00
OS_K2POWER_DIOPTERS: 43.50
OD_AXISANGLE_DEGREES: 171
OS_K1POWER_DIOPTERS: 43.00
OD_K2POWER_DIOPTERS: 44.00
OS_AXISANGLE_DEGREES: 029
METHOD_AUTO_MANUAL: AUTO

## 2024-04-19 ENCOUNTER — OFFICE (OUTPATIENT)
Dept: URBAN - METROPOLITAN AREA CLINIC 94 | Facility: CLINIC | Age: 84
Setting detail: OPHTHALMOLOGY
End: 2024-04-19
Payer: COMMERCIAL

## 2024-04-19 DIAGNOSIS — H25.11: ICD-10-CM

## 2024-04-19 DIAGNOSIS — H25.13: ICD-10-CM

## 2024-04-19 PROBLEM — H25.12 CATARACT NUCLEAR SCLEROSIS AGE RELATED; RIGHT EYE, LEFT EYE, BOTH EYES: Status: ACTIVE | Noted: 2024-04-19

## 2024-04-19 PROCEDURE — 99214 OFFICE O/P EST MOD 30 MIN: CPT | Mod: 24 | Performed by: OPHTHALMOLOGY

## 2024-04-19 PROCEDURE — 92136 OPHTHALMIC BIOMETRY: CPT | Mod: TC | Performed by: OPHTHALMOLOGY

## 2024-04-19 PROCEDURE — 92136 OPHTHALMIC BIOMETRY: CPT | Performed by: OPHTHALMOLOGY

## 2024-05-13 ENCOUNTER — OFFICE (OUTPATIENT)
Dept: URBAN - METROPOLITAN AREA CLINIC 94 | Facility: CLINIC | Age: 84
Setting detail: OPHTHALMOLOGY
End: 2024-05-13
Payer: COMMERCIAL

## 2024-05-13 DIAGNOSIS — H35.3233: ICD-10-CM

## 2024-05-13 DIAGNOSIS — H25.13: ICD-10-CM

## 2024-05-13 DIAGNOSIS — E11.3313: ICD-10-CM

## 2024-05-13 PROCEDURE — 92235 FLUORESCEIN ANGRPH MLTIFRAME: CPT | Performed by: SPECIALIST

## 2024-05-13 PROCEDURE — 99024 POSTOP FOLLOW-UP VISIT: CPT | Performed by: SPECIALIST

## 2024-05-13 PROCEDURE — 92134 CPTRZ OPH DX IMG PST SGM RTA: CPT | Performed by: SPECIALIST

## 2024-05-20 NOTE — ED ADULT NURSE NOTE - NSFALLRISKASMT_ED_ALL_ED_DT
"Critical result on June E Dennys, 1933, MRN [id number] Platelet count 1281 Collected 2/29/24 @1348  Ordering: Dr. Hernandez    [unfilled]    Please reply with \"Acknowledged\" upon receipt of this critical value. By acknowledging, you are agreeing to act upon this critical value. If this is not your patient, please advise.    Reported by Coty Quintana on 02/29/24 at 8:23 PM    Acknowledged by Tanmay Maya via [employer]    " Yes 25-Sep-2023 18:17

## 2024-06-10 ENCOUNTER — OFFICE (OUTPATIENT)
Dept: URBAN - METROPOLITAN AREA CLINIC 94 | Facility: CLINIC | Age: 84
Setting detail: OPHTHALMOLOGY
End: 2024-06-10
Payer: COMMERCIAL

## 2024-06-10 DIAGNOSIS — E11.3313: ICD-10-CM

## 2024-06-10 DIAGNOSIS — H35.3233: ICD-10-CM

## 2024-06-10 DIAGNOSIS — E11.3311: ICD-10-CM

## 2024-06-10 PROCEDURE — 67210 TREATMENT OF RETINAL LESION: CPT | Mod: 79,RT | Performed by: SPECIALIST

## 2024-06-10 PROCEDURE — 92134 CPTRZ OPH DX IMG PST SGM RTA: CPT | Performed by: SPECIALIST

## 2024-06-27 ENCOUNTER — OFFICE (OUTPATIENT)
Dept: URBAN - METROPOLITAN AREA CLINIC 94 | Facility: CLINIC | Age: 84
Setting detail: OPHTHALMOLOGY
End: 2024-06-27
Payer: COMMERCIAL

## 2024-06-27 DIAGNOSIS — E11.3312: ICD-10-CM

## 2024-06-27 DIAGNOSIS — H35.3233: ICD-10-CM

## 2024-06-27 PROCEDURE — 67028 INJECTION EYE DRUG: CPT | Mod: 79,LT | Performed by: SPECIALIST

## 2024-06-27 PROCEDURE — 92134 CPTRZ OPH DX IMG PST SGM RTA: CPT | Performed by: SPECIALIST

## 2024-07-22 ENCOUNTER — OFFICE (OUTPATIENT)
Dept: URBAN - METROPOLITAN AREA CLINIC 94 | Facility: CLINIC | Age: 84
Setting detail: OPHTHALMOLOGY
End: 2024-07-22
Payer: COMMERCIAL

## 2024-07-22 DIAGNOSIS — H35.3233: ICD-10-CM

## 2024-07-22 DIAGNOSIS — E11.3312: ICD-10-CM

## 2024-07-22 DIAGNOSIS — E11.3313: ICD-10-CM

## 2024-07-22 PROCEDURE — 92235 FLUORESCEIN ANGRPH MLTIFRAME: CPT | Performed by: SPECIALIST

## 2024-07-22 PROCEDURE — 67210 TREATMENT OF RETINAL LESION: CPT | Mod: 79,LT | Performed by: SPECIALIST

## 2024-07-22 PROCEDURE — 92134 CPTRZ OPH DX IMG PST SGM RTA: CPT | Performed by: SPECIALIST

## 2024-09-30 ENCOUNTER — OFFICE (OUTPATIENT)
Dept: URBAN - METROPOLITAN AREA CLINIC 94 | Facility: CLINIC | Age: 84
Setting detail: OPHTHALMOLOGY
End: 2024-09-30
Payer: COMMERCIAL

## 2024-09-30 DIAGNOSIS — E11.3311: ICD-10-CM

## 2024-09-30 DIAGNOSIS — E11.3313: ICD-10-CM

## 2024-09-30 DIAGNOSIS — H35.3233: ICD-10-CM

## 2024-09-30 PROCEDURE — 92134 CPTRZ OPH DX IMG PST SGM RTA: CPT | Performed by: SPECIALIST

## 2024-09-30 PROCEDURE — 67210 TREATMENT OF RETINAL LESION: CPT | Mod: 79,RT | Performed by: SPECIALIST

## 2024-09-30 ASSESSMENT — CONFRONTATIONAL VISUAL FIELD TEST (CVF)
OS_FINDINGS: FULL
OD_FINDINGS: FULL

## 2024-10-28 ENCOUNTER — OFFICE (OUTPATIENT)
Dept: URBAN - METROPOLITAN AREA CLINIC 94 | Facility: CLINIC | Age: 84
Setting detail: OPHTHALMOLOGY
End: 2024-10-28
Payer: COMMERCIAL

## 2024-10-28 DIAGNOSIS — E11.3313: ICD-10-CM

## 2024-10-28 DIAGNOSIS — E11.3312: ICD-10-CM

## 2024-10-28 DIAGNOSIS — H35.3233: ICD-10-CM

## 2024-10-28 PROCEDURE — 67210 TREATMENT OF RETINAL LESION: CPT | Mod: 79,LT | Performed by: SPECIALIST

## 2024-10-28 PROCEDURE — 92134 CPTRZ OPH DX IMG PST SGM RTA: CPT | Performed by: SPECIALIST

## 2024-10-28 ASSESSMENT — REFRACTION_AUTOREFRACTION
OS_SPHERE: +3.00
OS_AXIS: 101
OD_SPHERE: +2.75
OD_CYLINDER: -2.25
OD_AXIS: 088
OS_CYLINDER: -2.50

## 2024-10-28 ASSESSMENT — TONOMETRY
OD_IOP_MMHG: 17
OS_IOP_MMHG: 17

## 2024-10-28 ASSESSMENT — KERATOMETRY
OD_AXISANGLE_DEGREES: 170
OS_K2POWER_DIOPTERS: 43.50
OS_AXISANGLE_DEGREES: 020
OD_K2POWER_DIOPTERS: 44.25
OD_K1POWER_DIOPTERS: 43.00
METHOD_AUTO_MANUAL: AUTO
OS_K1POWER_DIOPTERS: 43.00

## 2024-10-28 ASSESSMENT — VISUAL ACUITY
OD_BCVA: CF 3FT
OS_BCVA: 20/80-2

## 2024-10-28 ASSESSMENT — CONFRONTATIONAL VISUAL FIELD TEST (CVF)
OD_FINDINGS: FULL
OS_FINDINGS: FULL

## 2025-01-06 ENCOUNTER — OFFICE (OUTPATIENT)
Dept: URBAN - METROPOLITAN AREA CLINIC 94 | Facility: CLINIC | Age: 85
Setting detail: OPHTHALMOLOGY
End: 2025-01-06
Payer: MEDICARE

## 2025-01-06 DIAGNOSIS — H35.3233: ICD-10-CM

## 2025-01-06 DIAGNOSIS — E11.3313: ICD-10-CM

## 2025-01-06 PROCEDURE — 92134 CPTRZ OPH DX IMG PST SGM RTA: CPT | Performed by: SPECIALIST

## 2025-01-06 PROCEDURE — 92235 FLUORESCEIN ANGRPH MLTIFRAME: CPT | Performed by: SPECIALIST

## 2025-01-06 PROCEDURE — 99024 POSTOP FOLLOW-UP VISIT: CPT | Performed by: SPECIALIST

## 2025-01-06 ASSESSMENT — VISUAL ACUITY
OD_BCVA: CF 3FT
OS_BCVA: 20/80-1

## 2025-01-06 ASSESSMENT — REFRACTION_AUTOREFRACTION
OD_CYLINDER: -2.25
OS_CYLINDER: -2.50
OD_SPHERE: +2.75
OD_AXIS: 088
OS_SPHERE: +3.00
OS_AXIS: 101

## 2025-01-06 ASSESSMENT — KERATOMETRY
METHOD_AUTO_MANUAL: AUTO
OD_K1POWER_DIOPTERS: 43.00
OD_AXISANGLE_DEGREES: 170
OS_K2POWER_DIOPTERS: 43.50
OS_K1POWER_DIOPTERS: 43.00
OD_K2POWER_DIOPTERS: 44.25
OS_AXISANGLE_DEGREES: 020

## 2025-01-20 ENCOUNTER — OFFICE (OUTPATIENT)
Dept: URBAN - METROPOLITAN AREA CLINIC 94 | Facility: CLINIC | Age: 85
Setting detail: OPHTHALMOLOGY
End: 2025-01-20
Payer: MEDICARE

## 2025-01-20 DIAGNOSIS — E11.3313: ICD-10-CM

## 2025-01-20 DIAGNOSIS — H35.3233: ICD-10-CM

## 2025-01-20 DIAGNOSIS — E11.3312: ICD-10-CM

## 2025-01-20 PROCEDURE — 67028 INJECTION EYE DRUG: CPT | Mod: 58,LT | Performed by: SPECIALIST

## 2025-01-20 PROCEDURE — 92134 CPTRZ OPH DX IMG PST SGM RTA: CPT | Performed by: SPECIALIST

## 2025-01-20 ASSESSMENT — KERATOMETRY
OS_K2POWER_DIOPTERS: 43.50
OD_K1POWER_DIOPTERS: 43.00
OD_AXISANGLE_DEGREES: 170
OD_K2POWER_DIOPTERS: 44.25
OS_AXISANGLE_DEGREES: 020
METHOD_AUTO_MANUAL: AUTO
OS_K1POWER_DIOPTERS: 43.00

## 2025-01-20 ASSESSMENT — REFRACTION_AUTOREFRACTION
OD_CYLINDER: -2.25
OS_SPHERE: +3.00
OD_SPHERE: +2.75
OS_AXIS: 101
OD_AXIS: 088
OS_CYLINDER: -2.50

## 2025-01-20 ASSESSMENT — TONOMETRY
OS_IOP_MMHG: 17
OD_IOP_MMHG: 17

## 2025-01-20 ASSESSMENT — CONFRONTATIONAL VISUAL FIELD TEST (CVF)
OD_FINDINGS: FULL
OS_FINDINGS: FULL

## 2025-01-20 ASSESSMENT — VISUAL ACUITY
OD_BCVA: CF 3FT
OS_BCVA: 20/100+1

## 2025-02-03 ENCOUNTER — OFFICE (OUTPATIENT)
Dept: URBAN - METROPOLITAN AREA CLINIC 94 | Facility: CLINIC | Age: 85
Setting detail: OPHTHALMOLOGY
End: 2025-02-03
Payer: MEDICARE

## 2025-02-03 DIAGNOSIS — E11.3313: ICD-10-CM

## 2025-02-03 DIAGNOSIS — E11.3311: ICD-10-CM

## 2025-02-03 DIAGNOSIS — H35.3233: ICD-10-CM

## 2025-02-03 PROCEDURE — 92134 CPTRZ OPH DX IMG PST SGM RTA: CPT | Performed by: SPECIALIST

## 2025-02-03 PROCEDURE — 67210 TREATMENT OF RETINAL LESION: CPT | Mod: RT | Performed by: SPECIALIST

## 2025-02-03 ASSESSMENT — KERATOMETRY
METHOD_AUTO_MANUAL: AUTO
OD_AXISANGLE_DEGREES: 170
OS_AXISANGLE_DEGREES: 020
OD_K2POWER_DIOPTERS: 44.25
OD_K1POWER_DIOPTERS: 43.00
OS_K1POWER_DIOPTERS: 43.00
OS_K2POWER_DIOPTERS: 43.50

## 2025-02-03 ASSESSMENT — REFRACTION_AUTOREFRACTION
OD_SPHERE: +2.75
OS_SPHERE: +3.00
OS_AXIS: 101
OS_CYLINDER: -2.50
OD_CYLINDER: -2.25
OD_AXIS: 088

## 2025-02-03 ASSESSMENT — TONOMETRY
OS_IOP_MMHG: 15
OD_IOP_MMHG: 20

## 2025-02-03 ASSESSMENT — CONFRONTATIONAL VISUAL FIELD TEST (CVF)
OS_FINDINGS: FULL
OD_FINDINGS: FULL

## 2025-02-03 ASSESSMENT — VISUAL ACUITY
OD_BCVA: CF 3FT
OS_BCVA: 20/100+1

## 2025-02-17 ENCOUNTER — OFFICE (OUTPATIENT)
Dept: URBAN - METROPOLITAN AREA CLINIC 94 | Facility: CLINIC | Age: 85
Setting detail: OPHTHALMOLOGY
End: 2025-02-17
Payer: MEDICARE

## 2025-02-17 DIAGNOSIS — E11.3312: ICD-10-CM

## 2025-02-17 PROCEDURE — 67210 TREATMENT OF RETINAL LESION: CPT | Mod: 79,LT | Performed by: SPECIALIST

## 2025-02-17 ASSESSMENT — REFRACTION_AUTOREFRACTION
OD_AXIS: 088
OD_CYLINDER: -2.25
OS_SPHERE: +3.00
OD_SPHERE: +2.75
OS_AXIS: 101
OS_CYLINDER: -2.50

## 2025-02-17 ASSESSMENT — KERATOMETRY
OS_K2POWER_DIOPTERS: 43.50
OS_K1POWER_DIOPTERS: 43.00
OD_K1POWER_DIOPTERS: 43.00
OS_AXISANGLE_DEGREES: 020
OD_K2POWER_DIOPTERS: 44.25
OD_AXISANGLE_DEGREES: 170
METHOD_AUTO_MANUAL: AUTO

## 2025-02-17 ASSESSMENT — CONFRONTATIONAL VISUAL FIELD TEST (CVF)
OD_FINDINGS: FULL
OS_FINDINGS: FULL

## 2025-02-17 ASSESSMENT — VISUAL ACUITY
OD_BCVA: CF 3FT
OS_BCVA: 20/100

## 2025-02-27 ENCOUNTER — OFFICE (OUTPATIENT)
Dept: URBAN - METROPOLITAN AREA CLINIC 94 | Facility: CLINIC | Age: 85
Setting detail: OPHTHALMOLOGY
End: 2025-02-27
Payer: MEDICARE

## 2025-02-27 DIAGNOSIS — H25.11: ICD-10-CM

## 2025-02-27 DIAGNOSIS — H25.12: ICD-10-CM

## 2025-02-27 DIAGNOSIS — H16.223: ICD-10-CM

## 2025-02-27 DIAGNOSIS — H25.13: ICD-10-CM

## 2025-02-27 PROCEDURE — 99213 OFFICE O/P EST LOW 20 MIN: CPT | Mod: 24 | Performed by: OPHTHALMOLOGY

## 2025-02-27 ASSESSMENT — KERATOMETRY
OS_K1POWER_DIOPTERS: 43.00
OS_AXISANGLE2_DEGREES: 026
METHOD_AUTO_MANUAL: AUTO
OD_AXISANGLE_DEGREES: 74
OS_K1POWER_DIOPTERS: 43.00
OD_CYLPOWER_DEGREES: 1
OD_K1POWER_DIOPTERS: 43.50
OD_K1K2_AVERAGE: 44
OS_K2POWER_DIOPTERS: 44.00
OD_AXISANGLE2_DEGREES: 164
OS_K2POWER_DIOPTERS: 44.00
OD_K1POWER_DIOPTERS: 43.50
OD_K2POWER_DIOPTERS: 44.50
OS_AXISANGLE_DEGREES: 116
OS_CYLAXISANGLE_DEGREES: 026
OD_CYLAXISANGLE_DEGREES: 164
OS_K1K2_AVERAGE: 43.5
OS_AXISANGLE_DEGREES: 026
OS_CYLPOWER_DEGREES: 1
OD_K2POWER_DIOPTERS: 44.50
OD_AXISANGLE_DEGREES: 164

## 2025-02-27 ASSESSMENT — SUPERFICIAL PUNCTATE KERATITIS (SPK)
OS_SPK: 1+
OD_SPK: 1+

## 2025-02-27 ASSESSMENT — REFRACTION_AUTOREFRACTION
OD_SPHERE: +1.00
OS_AXIS: 099
OS_SPHERE: +2.50
OS_CYLINDER: -2.25
OD_CYLINDER: -2.75
OD_AXIS: 090

## 2025-02-27 ASSESSMENT — CONFRONTATIONAL VISUAL FIELD TEST (CVF)
OD_FINDINGS: FULL
OS_FINDINGS: FULL

## 2025-02-27 ASSESSMENT — TONOMETRY
OS_IOP_MMHG: 16
OD_IOP_MMHG: 18

## 2025-02-27 ASSESSMENT — VISUAL ACUITY
OS_BCVA: 20/150
OD_BCVA: CF 2FT

## 2025-03-03 ENCOUNTER — OFFICE (OUTPATIENT)
Dept: URBAN - METROPOLITAN AREA CLINIC 94 | Facility: CLINIC | Age: 85
Setting detail: OPHTHALMOLOGY
End: 2025-03-03
Payer: MEDICARE

## 2025-03-03 DIAGNOSIS — E11.3313: ICD-10-CM

## 2025-03-03 DIAGNOSIS — H35.3233: ICD-10-CM

## 2025-03-03 PROCEDURE — 99024 POSTOP FOLLOW-UP VISIT: CPT | Performed by: SPECIALIST

## 2025-03-03 PROCEDURE — 92134 CPTRZ OPH DX IMG PST SGM RTA: CPT | Performed by: SPECIALIST

## 2025-03-03 ASSESSMENT — KERATOMETRY
OD_AXISANGLE_DEGREES: 164
OD_K1POWER_DIOPTERS: 43.50
OD_K2POWER_DIOPTERS: 44.50
OS_K1POWER_DIOPTERS: 43.00
OS_AXISANGLE_DEGREES: 026
METHOD_AUTO_MANUAL: AUTO
OS_K2POWER_DIOPTERS: 44.00

## 2025-03-03 ASSESSMENT — REFRACTION_AUTOREFRACTION
OD_CYLINDER: -2.75
OS_CYLINDER: -2.25
OS_SPHERE: +2.50
OS_AXIS: 099
OD_SPHERE: +1.00
OD_AXIS: 090

## 2025-03-03 ASSESSMENT — SUPERFICIAL PUNCTATE KERATITIS (SPK)
OD_SPK: 1+
OS_SPK: 1+

## 2025-03-03 ASSESSMENT — VISUAL ACUITY
OS_BCVA: 20/150
OD_BCVA: CF 2FT

## 2025-03-03 ASSESSMENT — TONOMETRY
OS_IOP_MMHG: 16
OD_IOP_MMHG: 20

## 2025-03-27 ENCOUNTER — OFFICE (OUTPATIENT)
Dept: URBAN - METROPOLITAN AREA CLINIC 94 | Facility: CLINIC | Age: 85
Setting detail: OPHTHALMOLOGY
End: 2025-03-27
Payer: MEDICARE

## 2025-03-27 DIAGNOSIS — H25.13: ICD-10-CM

## 2025-03-27 DIAGNOSIS — H25.12: ICD-10-CM

## 2025-03-27 DIAGNOSIS — H25.11: ICD-10-CM

## 2025-03-27 PROCEDURE — 99213 OFFICE O/P EST LOW 20 MIN: CPT | Mod: 24 | Performed by: OPHTHALMOLOGY

## 2025-03-27 ASSESSMENT — KERATOMETRY
OS_CYLAXISANGLE_DEGREES: 026
OS_K1POWER_DIOPTERS: 43.00
OD_K1POWER_DIOPTERS: 43.50
OD_AXISANGLE2_DEGREES: 164
METHOD_AUTO_MANUAL: AUTO
OS_K1K2_AVERAGE: 43.5
OS_AXISANGLE_DEGREES: 026
OD_K1K2_AVERAGE: 44
OS_K2POWER_DIOPTERS: 44.00
OS_CYLPOWER_DEGREES: 1
OD_CYLAXISANGLE_DEGREES: 164
OD_K2POWER_DIOPTERS: 44.50
OD_CYLPOWER_DEGREES: 1
OD_AXISANGLE_DEGREES: 74
OD_AXISANGLE_DEGREES: 164
OD_K2POWER_DIOPTERS: 44.50
OS_AXISANGLE_DEGREES: 116
OD_K1POWER_DIOPTERS: 43.50
OS_AXISANGLE2_DEGREES: 026
OS_K2POWER_DIOPTERS: 44.00
OS_K1POWER_DIOPTERS: 43.00

## 2025-03-27 ASSESSMENT — CONFRONTATIONAL VISUAL FIELD TEST (CVF)
OD_FINDINGS: FULL
OS_FINDINGS: FULL

## 2025-03-27 ASSESSMENT — TONOMETRY
OS_IOP_MMHG: 20
OD_IOP_MMHG: 16

## 2025-03-27 ASSESSMENT — VISUAL ACUITY
OD_BCVA: CF 2FT
OS_BCVA: 20/150

## 2025-03-27 ASSESSMENT — REFRACTION_AUTOREFRACTION
OD_CYLINDER: -2.75
OD_SPHERE: +1.00
OS_CYLINDER: -2.25
OS_AXIS: 099
OS_SPHERE: +2.50
OD_AXIS: 090

## 2025-03-27 ASSESSMENT — SUPERFICIAL PUNCTATE KERATITIS (SPK)
OD_SPK: 1+
OS_SPK: 1+

## 2025-03-31 ENCOUNTER — TELEHEALTH-OTHER THEN HOME (OUTPATIENT)
Facility: LOCATION | Age: 85
Setting detail: OPHTHALMOLOGY
End: 2025-03-31
Payer: MEDICARE

## 2025-03-31 DIAGNOSIS — H25.12: ICD-10-CM

## 2025-03-31 DIAGNOSIS — H25.13: ICD-10-CM

## 2025-03-31 DIAGNOSIS — H25.11: ICD-10-CM

## 2025-03-31 PROCEDURE — 99024 POSTOP FOLLOW-UP VISIT: CPT | Performed by: SPECIALIST

## 2025-03-31 ASSESSMENT — VISUAL ACUITY
OS_BCVA: 20/150
OD_BCVA: CF 2FT

## 2025-05-05 ASSESSMENT — KERATOMETRY
OS_K2POWER_DIOPTERS: 44.00
OD_AXISANGLE_DEGREES: 164
OS_AXISANGLE_DEGREES: 026
OD_K1POWER_DIOPTERS: 43.50
OD_K2POWER_DIOPTERS: 44.50
OS_K1POWER_DIOPTERS: 43.00
METHOD_AUTO_MANUAL: AUTO

## 2025-05-06 ENCOUNTER — OFFICE (OUTPATIENT)
Dept: URBAN - METROPOLITAN AREA CLINIC 94 | Facility: CLINIC | Age: 85
Setting detail: OPHTHALMOLOGY
End: 2025-05-06
Payer: MEDICARE

## 2025-05-06 DIAGNOSIS — H25.13: ICD-10-CM

## 2025-05-06 DIAGNOSIS — H25.12: ICD-10-CM

## 2025-05-06 DIAGNOSIS — H16.223: ICD-10-CM

## 2025-05-06 DIAGNOSIS — H43.393: ICD-10-CM

## 2025-05-06 PROCEDURE — 92136 OPHTHALMIC BIOMETRY: CPT | Mod: LT | Performed by: OPHTHALMOLOGY

## 2025-05-06 PROCEDURE — 99214 OFFICE O/P EST MOD 30 MIN: CPT | Performed by: OPHTHALMOLOGY

## 2025-05-06 PROCEDURE — 92136 OPHTHALMIC BIOMETRY: CPT | Mod: TC | Performed by: OPHTHALMOLOGY

## 2025-05-06 PROCEDURE — 92250 FUNDUS PHOTOGRAPHY W/I&R: CPT | Performed by: OPHTHALMOLOGY

## 2025-05-06 ASSESSMENT — KERATOMETRY
OS_AXISANGLE_DEGREES: 010
OD_AXISANGLE_DEGREES: 173
OS_CYLAXISANGLE_DEGREES: 010
OD_K2POWER_DIOPTERS: 44.25
OD_AXISANGLE_DEGREES: 83
OS_K1K2_AVERAGE: 43.25
OS_K1POWER_DIOPTERS: 42.75
OD_CYLPOWER_DEGREES: 1.25
OS_AXISANGLE2_DEGREES: 010
OD_K1POWER_DIOPTERS: 43.00
OS_K2POWER_DIOPTERS: 43.75
OD_K1POWER_DIOPTERS: 43.00
OD_K1K2_AVERAGE: 43.625
OD_K2POWER_DIOPTERS: 44.25
OS_K2POWER_DIOPTERS: 43.75
OS_K1POWER_DIOPTERS: 42.75
METHOD_AUTO_MANUAL: AUTO
OS_CYLPOWER_DEGREES: 1
OD_AXISANGLE2_DEGREES: 173
OD_CYLAXISANGLE_DEGREES: 173
OS_AXISANGLE_DEGREES: 100

## 2025-05-06 ASSESSMENT — REFRACTION_MANIFEST
OD_SPHERE: +1.25
OD_CYLINDER: -3.25
OD_VA1: 20/250
OS_AXIS: 097
OD_AXIS: 091
OS_SPHERE: +2.75
OS_CYLINDER: -2.50

## 2025-05-06 ASSESSMENT — VISUAL ACUITY
OD_BCVA: CF 2FT
OS_BCVA: 20/150

## 2025-05-06 ASSESSMENT — REFRACTION_AUTOREFRACTION
OD_CYLINDER: -3.25
OS_AXIS: 097
OD_SPHERE: +1.25
OD_AXIS: 091
OS_SPHERE: +2.75
OS_CYLINDER: -2.50

## 2025-05-06 ASSESSMENT — SUPERFICIAL PUNCTATE KERATITIS (SPK)
OD_SPK: 1+
OS_SPK: 1+

## 2025-05-06 ASSESSMENT — TONOMETRY
OD_IOP_MMHG: 15
OS_IOP_MMHG: 15

## 2025-05-06 ASSESSMENT — CONFRONTATIONAL VISUAL FIELD TEST (CVF)
OS_FINDINGS: FULL
OD_FINDINGS: FULL

## 2025-05-19 ENCOUNTER — OFFICE (OUTPATIENT)
Dept: URBAN - METROPOLITAN AREA CLINIC 94 | Facility: CLINIC | Age: 85
Setting detail: OPHTHALMOLOGY
End: 2025-05-19
Payer: MEDICARE

## 2025-05-19 DIAGNOSIS — E11.3313: ICD-10-CM

## 2025-05-19 DIAGNOSIS — Z01.818: ICD-10-CM

## 2025-05-19 DIAGNOSIS — H25.13: ICD-10-CM

## 2025-05-19 PROBLEM — H43.393 VITREOUS FLOATERS; BOTH EYES: Status: ACTIVE | Noted: 2025-05-06

## 2025-05-19 PROCEDURE — 99212 OFFICE O/P EST SF 10 MIN: CPT

## 2025-06-04 ENCOUNTER — ASC (OUTPATIENT)
Dept: URBAN - METROPOLITAN AREA SURGERY 8 | Facility: SURGERY | Age: 85
Setting detail: OPHTHALMOLOGY
End: 2025-06-04
Payer: MEDICARE

## 2025-06-04 DIAGNOSIS — H25.12: ICD-10-CM

## 2025-06-04 PROCEDURE — 66984 XCAPSL CTRC RMVL W/O ECP: CPT | Mod: LT | Performed by: OPHTHALMOLOGY

## 2025-06-05 ENCOUNTER — OFFICE (OUTPATIENT)
Dept: URBAN - METROPOLITAN AREA CLINIC 112 | Facility: CLINIC | Age: 85
Setting detail: OPHTHALMOLOGY
End: 2025-06-05
Payer: MEDICARE

## 2025-06-05 ENCOUNTER — RX ONLY (RX ONLY)
Age: 85
End: 2025-06-05

## 2025-06-05 DIAGNOSIS — Z96.1: ICD-10-CM

## 2025-06-05 PROCEDURE — 99024 POSTOP FOLLOW-UP VISIT: CPT | Performed by: PHYSICIAN ASSISTANT

## 2025-06-05 ASSESSMENT — CORNEAL EDEMA - FOLDS/STRIAE: OS_FOLDSSTRIAE: 1+

## 2025-06-05 ASSESSMENT — REFRACTION_AUTOREFRACTION
OS_SPHERE: 0.00
OD_CYLINDER: -2.75
OD_AXIS: 093
OS_AXIS: 107
OD_SPHERE: +0.75
OS_CYLINDER: -1.50

## 2025-06-05 ASSESSMENT — SUPERFICIAL PUNCTATE KERATITIS (SPK)
OD_SPK: 1+
OS_SPK: 1+

## 2025-06-05 ASSESSMENT — TONOMETRY
OS_IOP_MMHG: 18
OD_IOP_MMHG: 19

## 2025-06-05 ASSESSMENT — CORNEAL EDEMA CLINICAL DESCRIPTION: OS_CORNEALEDEMA: T

## 2025-06-05 ASSESSMENT — KERATOMETRY
OD_AXISANGLE_DEGREES: 164
OS_K1POWER_DIOPTERS: 43.00
OD_K2POWER_DIOPTERS: 44.25
OS_K2POWER_DIOPTERS: 43.75
OD_K1POWER_DIOPTERS: 43.25
OS_AXISANGLE_DEGREES: 038

## 2025-06-05 ASSESSMENT — VISUAL ACUITY
OD_BCVA: 20/100-1
OS_BCVA: 20/100-1

## 2025-06-12 ENCOUNTER — OFFICE (OUTPATIENT)
Dept: URBAN - METROPOLITAN AREA CLINIC 94 | Facility: CLINIC | Age: 85
Setting detail: OPHTHALMOLOGY
End: 2025-06-12
Payer: MEDICARE

## 2025-06-12 DIAGNOSIS — H35.3233: ICD-10-CM

## 2025-06-12 DIAGNOSIS — E11.3312: ICD-10-CM

## 2025-06-12 DIAGNOSIS — E11.3311: ICD-10-CM

## 2025-06-12 PROCEDURE — 92134 CPTRZ OPH DX IMG PST SGM RTA: CPT | Performed by: SPECIALIST

## 2025-06-12 PROCEDURE — 92235 FLUORESCEIN ANGRPH MLTIFRAME: CPT | Performed by: SPECIALIST

## 2025-06-12 PROCEDURE — 67210 TREATMENT OF RETINAL LESION: CPT | Mod: 79,RT | Performed by: SPECIALIST

## 2025-06-12 ASSESSMENT — KERATOMETRY
OS_AXISANGLE_DEGREES: 038
OD_K2POWER_DIOPTERS: 44.25
OD_AXISANGLE_DEGREES: 164
OS_K2POWER_DIOPTERS: 43.75
OS_K1POWER_DIOPTERS: 43.00
OD_K1POWER_DIOPTERS: 43.25

## 2025-06-12 ASSESSMENT — CONFRONTATIONAL VISUAL FIELD TEST (CVF)
OD_FINDINGS: FULL
OS_FINDINGS: FULL

## 2025-06-12 ASSESSMENT — CORNEAL EDEMA - FOLDS/STRIAE: OS_FOLDSSTRIAE: 1+

## 2025-06-12 ASSESSMENT — REFRACTION_AUTOREFRACTION
OD_AXIS: 093
OS_CYLINDER: -1.50
OD_CYLINDER: -2.75
OD_SPHERE: +0.75
OS_AXIS: 107
OS_SPHERE: 0.00

## 2025-06-12 ASSESSMENT — VISUAL ACUITY
OD_BCVA: CF 2FT
OS_BCVA: 20/100-1

## 2025-06-12 ASSESSMENT — SUPERFICIAL PUNCTATE KERATITIS (SPK)
OD_SPK: 1+
OS_SPK: 1+

## 2025-06-12 ASSESSMENT — CORNEAL EDEMA CLINICAL DESCRIPTION: OS_CORNEALEDEMA: T

## 2025-06-12 ASSESSMENT — TONOMETRY
OS_IOP_MMHG: 16
OD_IOP_MMHG: 15

## 2025-06-13 ENCOUNTER — OFFICE (OUTPATIENT)
Dept: URBAN - METROPOLITAN AREA CLINIC 94 | Facility: CLINIC | Age: 85
Setting detail: OPHTHALMOLOGY
End: 2025-06-13
Payer: MEDICARE

## 2025-06-13 DIAGNOSIS — Z96.1: ICD-10-CM

## 2025-06-13 PROCEDURE — 99024 POSTOP FOLLOW-UP VISIT: CPT | Performed by: PHYSICIAN ASSISTANT

## 2025-06-13 ASSESSMENT — SUPERFICIAL PUNCTATE KERATITIS (SPK)
OS_SPK: 1+
OD_SPK: 1+

## 2025-06-13 ASSESSMENT — KERATOMETRY
OD_AXISANGLE_DEGREES: 169
OS_AXISANGLE_DEGREES: 177
OS_K2POWER_DIOPTERS: 43.75
OS_K1POWER_DIOPTERS: 43.25
OD_K2POWER_DIOPTERS: 44.25
OD_K1POWER_DIOPTERS: 43.00

## 2025-06-13 ASSESSMENT — VISUAL ACUITY
OS_BCVA: 20/100-1
OD_BCVA: CF 3FT

## 2025-06-13 ASSESSMENT — CONFRONTATIONAL VISUAL FIELD TEST (CVF)
OS_FINDINGS: FULL
OD_FINDINGS: FULL

## 2025-06-13 ASSESSMENT — REFRACTION_MANIFEST
OS_SPHERE: +0.25
OS_VA1: 20/350
OS_CYLINDER: -1.50
OS_AXIS: 090

## 2025-06-13 ASSESSMENT — REFRACTION_AUTOREFRACTION
OD_AXIS: 099
OS_AXIS: 088
OS_SPHERE: +0.25
OS_CYLINDER: -1.50
OD_SPHERE: +PL
OD_CYLINDER: -3.25

## 2025-06-13 ASSESSMENT — TONOMETRY
OD_IOP_MMHG: 18
OS_IOP_MMHG: 16

## 2025-06-16 ENCOUNTER — OFFICE (OUTPATIENT)
Dept: URBAN - METROPOLITAN AREA CLINIC 94 | Facility: CLINIC | Age: 85
Setting detail: OPHTHALMOLOGY
End: 2025-06-16
Payer: MEDICARE

## 2025-06-16 DIAGNOSIS — E11.3313: ICD-10-CM

## 2025-06-16 DIAGNOSIS — H35.3233: ICD-10-CM

## 2025-06-16 DIAGNOSIS — E11.3312: ICD-10-CM

## 2025-06-16 PROCEDURE — 67028 INJECTION EYE DRUG: CPT | Mod: 79,LT | Performed by: SPECIALIST

## 2025-06-16 PROCEDURE — 92235 FLUORESCEIN ANGRPH MLTIFRAME: CPT | Performed by: SPECIALIST

## 2025-06-16 PROCEDURE — 92134 CPTRZ OPH DX IMG PST SGM RTA: CPT | Performed by: SPECIALIST

## 2025-06-16 ASSESSMENT — SUPERFICIAL PUNCTATE KERATITIS (SPK)
OS_SPK: 1+
OD_SPK: 1+

## 2025-06-16 ASSESSMENT — CONFRONTATIONAL VISUAL FIELD TEST (CVF)
OD_FINDINGS: FULL
OS_FINDINGS: FULL

## 2025-06-16 ASSESSMENT — KERATOMETRY
OD_AXISANGLE_DEGREES: 169
OD_K1POWER_DIOPTERS: 43.00
OD_K2POWER_DIOPTERS: 44.25
OS_K1POWER_DIOPTERS: 43.25
OS_K2POWER_DIOPTERS: 43.75
OS_AXISANGLE_DEGREES: 177

## 2025-06-16 ASSESSMENT — REFRACTION_AUTOREFRACTION
OD_SPHERE: +PL
OD_AXIS: 099
OS_SPHERE: +0.25
OS_AXIS: 088
OD_CYLINDER: -3.25
OS_CYLINDER: -1.50

## 2025-06-16 ASSESSMENT — VISUAL ACUITY
OD_BCVA: CF 3FT
OS_BCVA: 20/100-1

## 2025-06-16 ASSESSMENT — TONOMETRY
OS_IOP_MMHG: 16
OD_IOP_MMHG: 18

## 2025-06-30 ENCOUNTER — OFFICE (OUTPATIENT)
Dept: URBAN - METROPOLITAN AREA CLINIC 94 | Facility: CLINIC | Age: 85
Setting detail: OPHTHALMOLOGY
End: 2025-06-30
Payer: MEDICARE

## 2025-06-30 DIAGNOSIS — H35.3233: ICD-10-CM

## 2025-06-30 DIAGNOSIS — E11.3312: ICD-10-CM

## 2025-06-30 DIAGNOSIS — E11.3311: ICD-10-CM

## 2025-06-30 PROBLEM — Z96.1 PSEUDOPHAKIA: Status: ACTIVE | Noted: 2025-06-05

## 2025-06-30 PROBLEM — H25.11 CATARACT SENILE NUCLEAR SCLEROSIS; RIGHT EYE: Status: ACTIVE | Noted: 2025-06-05

## 2025-06-30 PROCEDURE — 92134 CPTRZ OPH DX IMG PST SGM RTA: CPT | Performed by: SPECIALIST

## 2025-06-30 PROCEDURE — 67210 TREATMENT OF RETINAL LESION: CPT | Mod: 79,LT | Performed by: SPECIALIST

## 2025-06-30 ASSESSMENT — VISUAL ACUITY
OD_BCVA: CF 3FT
OS_BCVA: 20/150

## 2025-06-30 ASSESSMENT — CONFRONTATIONAL VISUAL FIELD TEST (CVF)
OS_FINDINGS: FULL
OD_FINDINGS: FULL

## 2025-06-30 ASSESSMENT — TONOMETRY
OD_IOP_MMHG: 20
OS_IOP_MMHG: 21

## 2025-07-31 ENCOUNTER — OFFICE (OUTPATIENT)
Dept: URBAN - METROPOLITAN AREA CLINIC 94 | Facility: CLINIC | Age: 85
Setting detail: OPHTHALMOLOGY
End: 2025-07-31
Payer: MEDICARE

## 2025-07-31 DIAGNOSIS — E11.3312: ICD-10-CM

## 2025-07-31 DIAGNOSIS — H35.3233: ICD-10-CM

## 2025-07-31 DIAGNOSIS — E11.3311: ICD-10-CM

## 2025-07-31 PROCEDURE — 92134 CPTRZ OPH DX IMG PST SGM RTA: CPT | Performed by: SPECIALIST

## 2025-07-31 PROCEDURE — 99024 POSTOP FOLLOW-UP VISIT: CPT | Performed by: SPECIALIST

## 2025-07-31 ASSESSMENT — VISUAL ACUITY
OD_BCVA: CF 4FT
OS_BCVA: 20/100+

## 2025-07-31 ASSESSMENT — TONOMETRY
OS_IOP_MMHG: 12
OD_IOP_MMHG: 18

## 2025-07-31 ASSESSMENT — CONFRONTATIONAL VISUAL FIELD TEST (CVF)
OS_FINDINGS: FULL
OD_FINDINGS: FULL